# Patient Record
Sex: MALE | URBAN - METROPOLITAN AREA
[De-identification: names, ages, dates, MRNs, and addresses within clinical notes are randomized per-mention and may not be internally consistent; named-entity substitution may affect disease eponyms.]

---

## 2024-08-01 ENCOUNTER — TELEPHONE (OUTPATIENT)
Dept: TRANSPLANT | Facility: CLINIC | Age: 69
End: 2024-08-01

## 2024-08-12 ENCOUNTER — TELEPHONE (OUTPATIENT)
Dept: TRANSPLANT | Facility: CLINIC | Age: 69
End: 2024-08-12

## 2024-09-19 ENCOUNTER — HOSPITAL ENCOUNTER (OUTPATIENT)
Facility: OTHER | Age: 69
Discharge: HOME | End: 2024-09-19
Attending: INTERNAL MEDICINE | Admitting: INTERNAL MEDICINE
Payer: MEDICARE

## 2024-09-19 VITALS
SYSTOLIC BLOOD PRESSURE: 171 MMHG | TEMPERATURE: 98 F | HEIGHT: 66 IN | BODY MASS INDEX: 27.32 KG/M2 | OXYGEN SATURATION: 92 % | WEIGHT: 170 LBS | HEART RATE: 88 BPM | RESPIRATION RATE: 18 BRPM | DIASTOLIC BLOOD PRESSURE: 77 MMHG

## 2024-09-19 DIAGNOSIS — T82.868A THROMBOSIS OF KIDNEY DIALYSIS ARTERIOVENOUS GRAFT, INITIAL ENCOUNTER: Primary | ICD-10-CM

## 2024-09-19 DIAGNOSIS — N18.6 END STAGE RENAL DISEASE: ICD-10-CM

## 2024-09-19 LAB — POCT GLUCOSE: 73 MG/DL (ref 70–110)

## 2024-09-19 PROCEDURE — C1887 CATHETER, GUIDING: HCPCS | Mod: TXP | Performed by: INTERNAL MEDICINE

## 2024-09-19 PROCEDURE — C1725 CATH, TRANSLUMIN NON-LASER: HCPCS | Mod: TXP | Performed by: INTERNAL MEDICINE

## 2024-09-19 PROCEDURE — C1894 INTRO/SHEATH, NON-LASER: HCPCS | Mod: TXP | Performed by: INTERNAL MEDICINE

## 2024-09-19 PROCEDURE — C1757 CATH, THROMBECTOMY/EMBOLECT: HCPCS | Mod: TXP | Performed by: INTERNAL MEDICINE

## 2024-09-19 PROCEDURE — 63600175 PHARM REV CODE 636 W HCPCS: Mod: JZ,JG,TXP | Performed by: INTERNAL MEDICINE

## 2024-09-19 PROCEDURE — 99153 MOD SED SAME PHYS/QHP EA: CPT | Mod: TXP | Performed by: INTERNAL MEDICINE

## 2024-09-19 PROCEDURE — 99152 MOD SED SAME PHYS/QHP 5/>YRS: CPT | Mod: TXP | Performed by: INTERNAL MEDICINE

## 2024-09-19 PROCEDURE — 27201423 OPTIME MED/SURG SUP & DEVICES STERILE SUPPLY: Mod: TXP | Performed by: INTERNAL MEDICINE

## 2024-09-19 PROCEDURE — C1769 GUIDE WIRE: HCPCS | Mod: TXP | Performed by: INTERNAL MEDICINE

## 2024-09-19 RX ORDER — CARVEDILOL 25 MG/1
25 TABLET ORAL
COMMUNITY
Start: 2024-04-05

## 2024-09-19 RX ORDER — HYDROXYCHLOROQUINE SULFATE 200 MG/1
200 TABLET, FILM COATED ORAL
COMMUNITY
Start: 2023-10-05

## 2024-09-19 RX ORDER — SEVELAMER CARBONATE 800 MG/1
1600 TABLET, FILM COATED ORAL
COMMUNITY
Start: 2023-11-28

## 2024-09-19 RX ORDER — MIDAZOLAM HYDROCHLORIDE 1 MG/ML
INJECTION INTRAMUSCULAR; INTRAVENOUS
Status: DISCONTINUED | OUTPATIENT
Start: 2024-09-19 | End: 2024-09-19 | Stop reason: HOSPADM

## 2024-09-19 RX ORDER — HEPARIN SODIUM 1000 [USP'U]/ML
INJECTION, SOLUTION INTRAVENOUS; SUBCUTANEOUS
Status: DISCONTINUED | OUTPATIENT
Start: 2024-09-19 | End: 2024-09-19 | Stop reason: HOSPADM

## 2024-09-19 RX ORDER — CHOLECALCIFEROL (VITAMIN D3) 50 MCG
50 TABLET ORAL
COMMUNITY
Start: 2024-06-06

## 2024-09-19 RX ORDER — FENTANYL CITRATE 50 UG/ML
INJECTION, SOLUTION INTRAMUSCULAR; INTRAVENOUS
Status: DISCONTINUED | OUTPATIENT
Start: 2024-09-19 | End: 2024-09-19 | Stop reason: HOSPADM

## 2024-09-19 RX ORDER — SERTRALINE HYDROCHLORIDE 50 MG/1
1 TABLET, FILM COATED ORAL DAILY
COMMUNITY

## 2024-09-19 RX ORDER — ALBUTEROL SULFATE 90 UG/1
INHALANT RESPIRATORY (INHALATION)
COMMUNITY
Start: 2024-06-06

## 2024-09-19 RX ORDER — HYDRALAZINE HYDROCHLORIDE 50 MG/1
1 TABLET, FILM COATED ORAL 3 TIMES DAILY
COMMUNITY
Start: 2023-12-06

## 2024-09-19 RX ORDER — VIT B COMP NO.3/FOLIC/C/BIOTIN 1 MG-60 MG
1 TABLET ORAL
COMMUNITY
Start: 2024-05-06

## 2024-09-19 RX ORDER — PRAVASTATIN SODIUM 80 MG/1
40 TABLET ORAL
COMMUNITY
Start: 2024-06-06

## 2024-09-19 NOTE — Clinical Note
35 ml of contrast were injected throughout the case. 15 mL of contrast was the total wasted during the case. 50 mL was the total amount used during the case.

## 2024-09-19 NOTE — H&P
[facility]  History & Physical    Subjective:      Chief Complaint/Reason for Admission: Here for declot    Papi Goins is a 69 y.o. male with ESRD (MWF at [facility] with Dr. Saeed) who presents today for declot after noting no flow yesterday.  Of note, he says he clotted before, many months ago.  This access is a couple of years old.    Past Medical History:   Diagnosis Date    COPD (chronic obstructive pulmonary disease)     Diabetes mellitus     ESRD (end stage renal disease) on dialysis     Hypertension      History reviewed. No pertinent surgical history.  Social History     Tobacco Use    Smoking status: Former     Types: Cigarettes    Smokeless tobacco: Never       PTA Medications   Medication Sig    albuterol (PROVENTIL/VENTOLIN HFA) 90 mcg/actuation inhaler Take by mouth.    carvediloL (COREG) 25 MG tablet Take 25 mg by mouth.    cholecalciferol, vitamin D3, (VITAMIN D3) 50 mcg (2,000 unit) Tab Take 50 mcg by mouth.    hydrALAZINE (APRESOLINE) 50 MG tablet Take 1 tablet by mouth 3 (three) times daily.    hydroxychloroquine (PLAQUENIL) 200 mg tablet Take 200 mg by mouth.    pravastatin (PRAVACHOL) 80 MG tablet Take 40 mg by mouth.    LUIS-FRANNY RX 1- mg-mg-mcg Tab Take 1 tablet by mouth.    sertraline (ZOLOFT) 50 MG tablet Take 1 tablet by mouth once daily.    sevelamer carbonate (RENVELA) 800 mg Tab Take 1,600 mg by mouth.     Review of patient's allergies indicates:  No Known Allergies     Review of Systems   Constitutional: Negative.    Respiratory: Negative.     Cardiovascular: Negative.        Objective:      Vital Signs (Most Recent)  Temp: 98 °F (36.7 °C) (09/19/24 1136)  Pulse: 63 (09/19/24 1136)  Resp: 18 (09/19/24 1136)  BP: (!) 142/76 (09/19/24 1136)  SpO2: (!) 94 % (09/19/24 1136)    Vital Signs Range (Last 24H):  Temp:  [98 °F (36.7 °C)]   Pulse:  [63]   Resp:  [18]   BP: (142)/(76)   SpO2:  [94 %]     Physical Exam  Constitutional:       General: He is not in acute distress.      Appearance: He is well-developed. He is not diaphoretic.   HENT:      Head: Normocephalic and atraumatic.   Pulmonary:      Effort: Pulmonary effort is normal. No respiratory distress.   Musculoskeletal:      Cervical back: Neck supple.      Comments: RUE straight arm AVG with no flow or pulse augmentation.   Skin:     General: Skin is warm and dry.   Neurological:      Mental Status: He is alert and oriented to person, place, and time.   Psychiatric:         Behavior: Behavior normal.         Assessment:      There are no hospital problems to display for this patient.      Plan:      Declot  today.  Risks explained, consent signed.

## 2024-09-19 NOTE — BRIEF OP NOTE
[facility] - Cath Lab  Brief Operative Note    Surgery Date: 9/19/2024     Surgeons and Role:     * Leandro Aviles MD - Primary    Assisting Surgeon: Brian Whitaker MD    Pre-op Diagnosis:  End stage renal disease [N18.6]    Post-op Diagnosis:  Post-Op Diagnosis Codes:     * End stage renal disease [N18.6]    Procedure(s) (LRB):  THROMBECTOMY, HEMODIALYSIS GRAFT OR FISTULA / UPPER ARM AVG / AVF (Right)    Anesthesia: 1% lidocaine, 1 mg versed, 50 mcg fentanyl    Operative Findings: Patient was prepped and draped in a sterile fashion.  This was a successful declot of his RUE straight arm AV graft with angioplasty performed in the venous anastomosis portion of the graft.  Patient tolerated the procedure well and no immediate complications noted.      Estimated Blood Loss: 10 mL         Specimens:   Specimen (24h ago, onward)      None              Discharge Note    OUTCOME:  Patient was prepped and draped in a sterile fashion.  This was a successful declot of his RUE straight arm AV graft with angioplasty performed in the venous anastomosis portion of the graft.  Patient tolerated the procedure well and no immediate complications noted.    DISPOSITION: Home or Self Care    FINAL DIAGNOSIS:  <principal problem not specified>    FOLLOWUP: In clinic in 1 week for suture removal and follow up exam/ultrasound    DISCHARGE INSTRUCTIONS:    Discharge Procedure Orders   Lifting restrictions   Order Comments: No heavy lifting for 24 hours.     Call MD for:  temperature >100.4     Call MD for:  severe uncontrolled pain     Call MD for:  redness, tenderness, or signs of infection (pain, swelling, redness, odor or green/yellow discharge around incision site)     Call MD for:   Order Comments: Bleeding from the access site.     Activity as tolerated

## 2024-09-19 NOTE — PROCEDURES
[facility] Interventional Nephrology Procedure Report    Date of Procedure:  09/19/2024 2:46 PM     :  Leandro Aviles MD  Assistant: Brian Whitaker MD     Indication for Procedure:  Thrombosed RUE straight arm AVG      Referring Provider:  [facility] dialysis unit and Dr. Saeed     Procedures Performed:  1.  Access cannulation  2.  Second cannulation  3.  Venous administration of TPA  4.  Venous angioplasty  5.  Percutaneous thrombectomy  6.  Arteriogram  7.  Moderate conscious sedation     Medications Administered:  1.  2 mg of tPA IV  2.  Heparin 4000 units IV  3.  Versed 1 mg IV  4.  Fentanyl 50 mcg IV     Blood Loss:  Approximately 10 mL     Contrast Used:  Approximately 50 mL     Procedure in Detail:    After informed consent was obtained, the patient was prepped and draped in the usual sterile fashion.  His RUE straight arm AV graft was cannulated in the venous facing direction with a micropuncture set.  The microwire was confirmed to be in correct location under fluoroscopy and a 4-Nigerian microsheath was established.  A glidewire was advanced easily to the central circulation and the microsheath was removed.  A 6-Nigerian sheath was established.  Then, an over-the-wire Berenstein guiding catheter was advanced to the central circulation and a central venogram was performed, which revealed patent central vasculature (although there was a stenosis noted at the proximal portion of the SVC).  After the patient was evaluated with the physician, moderate sedation was then administered.  A pullback venogram was performed, which revealed stenosis beginning at the venous anastomosis portion of the graft.  So, the wire was reinserted and the Berenstein catheter was removed.  Then, 2 mg of TPA was injected into the venous facing sheath with pressure was held at the arterial anastomosis.  Following this, a 7 x 80 mm conquest PTA balloon was advanced to the venous anastomosis and inflated to full effacement with waist  seen on the balloon prior to full effacement.  Angioplasty was repeated back within the graft to macerate the thrombus all the while holding pressure at the arterial anastomosis.       The balloon was then removed and a second cannulation was made in the arterial facing direction with the micropuncture set.  Again, the microwire was confirmed to be in the correct location under fluoroscopy.  Microsheath was established and a glidewire was advanced across the arterial anastomosis and up into the brachial artery and then the micro sheath was removed and a 6-Nicaraguan sheath was established.  Then using an over-the-wire Lina catheter, we readvanced across the arterial anastomosis, inflated and a pullback maneuver was performed with significant thrombus aspirated through the arterial facing sheath.  This procedure was repeated approximately 4 times after which there was noted to be increased pulsatility of the AV graft.  So at this point, the Lina catheter was advanced across the arterial anastomosis and an arteriogram was performed.  At least 6 cm of brachial artery was seen passing down into the forearm and passed the bifurcation into the radial and ulnar arteries and no distal emboli were seen.   There was, however some residual clot in the rzxah-mhaajgrg-rwqwjlmkmam region of the graft, so we repeated Lina pullback was performed approximately 2 more  times.  We then took the Lina catheter on the venous facing wire and swept towards the central circulation to sweep out any residual thrombus and there was improved thrilling flow felt up the AV graft.      The arterial facing guidewire and sheath were then removed and hemostasis was achieved using a #2 Ethilon suture.  We then administered 4000 units of IV heparin and a repeat fistulogram was performed, which revealed brisk flow through the lower portion of the AV graft with residual stenosis seen at the venous anastomosis, so the same 7 x 80 mm conquest  balloon was advanced to the venous anastomosis and again was inflated to full effacement with mild waist (minimal pressure required to fully inflate).  This was held fully effaced for 1 minute, after which a repeat angiogram was performed which showed no change in the venous anastomosis, but this was not felt to be a hemodynamically significant lesion.  The venous facing sheath was then removed and hemostasis was achieved using a #2 Ethilon suture.        IMPRESSION AND PLAN:  This is a successful percutaneous thrombectomy using mechanical and pharmacologic thrombolysis.  We will have the patient return in 1 week for suture removal and a followup ultrasound.        Leandro Aviles MD  [phone]

## 2024-09-19 NOTE — PLAN OF CARE
Papi Goins has met all discharge criteria from Phase II. Vital Signs are stable, ambulating  without difficulty. Discharge instructions given, patient verbalized understanding. Discharged from facility via wheelchair in stable condition.

## 2024-11-25 ENCOUNTER — TELEPHONE (OUTPATIENT)
Dept: TRANSPLANT | Facility: CLINIC | Age: 69
End: 2024-11-25
Payer: MEDICARE

## 2024-11-26 DIAGNOSIS — Z91.89 CARDIOVASCULAR EVENT RISK: ICD-10-CM

## 2024-11-26 DIAGNOSIS — Z76.82 ORGAN TRANSPLANT CANDIDATE: ICD-10-CM

## 2024-11-26 DIAGNOSIS — N18.6 END STAGE RENAL DISEASE: Primary | ICD-10-CM

## 2024-12-27 ENCOUNTER — TELEPHONE (OUTPATIENT)
Dept: TRANSPLANT | Facility: CLINIC | Age: 69
End: 2024-12-27
Payer: MEDICAID

## 2024-12-30 ENCOUNTER — TELEPHONE (OUTPATIENT)
Dept: TRANSPLANT | Facility: CLINIC | Age: 69
End: 2024-12-30
Payer: MEDICAID

## 2024-12-30 NOTE — TELEPHONE ENCOUNTER
MA notes per Adherence form     FOR THE PAST THREE MONTHS:    1-AMA's 10/21/24 no time or reason given   0-No-shows    No concerns with care giving, transportation, or mental health    Scanned in pt's media    Dorene Espinoza  Abdominal Transplant MA

## 2025-01-02 ENCOUNTER — TELEPHONE (OUTPATIENT)
Dept: TRANSPLANT | Facility: CLINIC | Age: 70
End: 2025-01-02
Payer: MEDICARE

## 2025-02-04 ENCOUNTER — HOSPITAL ENCOUNTER (OUTPATIENT)
Dept: RADIOLOGY | Facility: HOSPITAL | Age: 70
Discharge: HOME | End: 2025-02-04
Attending: NURSE PRACTITIONER
Payer: MEDICARE

## 2025-02-04 ENCOUNTER — OFFICE VISIT (OUTPATIENT)
Dept: TRANSPLANT | Facility: CLINIC | Age: 70
End: 2025-02-04
Payer: MEDICARE

## 2025-02-04 ENCOUNTER — TELEPHONE (OUTPATIENT)
Dept: TRANSPLANT | Facility: CLINIC | Age: 70
End: 2025-02-04
Payer: MEDICARE

## 2025-02-04 VITALS
TEMPERATURE: 97 F | DIASTOLIC BLOOD PRESSURE: 48 MMHG | BODY MASS INDEX: 25.85 KG/M2 | HEART RATE: 72 BPM | OXYGEN SATURATION: 94 % | SYSTOLIC BLOOD PRESSURE: 97 MMHG | RESPIRATION RATE: 18 BRPM | HEIGHT: 67 IN | WEIGHT: 164.69 LBS

## 2025-02-04 DIAGNOSIS — Z99.2 ESRD ON HEMODIALYSIS: ICD-10-CM

## 2025-02-04 DIAGNOSIS — L93.0 DISCOID LUPUS ERYTHEMATOSUS: ICD-10-CM

## 2025-02-04 DIAGNOSIS — N18.6 HYPERPARATHYROIDISM DUE TO ESRD ON DIALYSIS: ICD-10-CM

## 2025-02-04 DIAGNOSIS — Z99.2 TYPE 2 DIABETES MELLITUS WITH CHRONIC KIDNEY DISEASE ON CHRONIC DIALYSIS, WITHOUT LONG-TERM CURRENT USE OF INSULIN: ICD-10-CM

## 2025-02-04 DIAGNOSIS — N18.6 ANEMIA IN ESRD (END-STAGE RENAL DISEASE): ICD-10-CM

## 2025-02-04 DIAGNOSIS — Z76.82 ORGAN TRANSPLANT CANDIDATE: ICD-10-CM

## 2025-02-04 DIAGNOSIS — N18.6 TYPE 2 DIABETES MELLITUS WITH CHRONIC KIDNEY DISEASE ON CHRONIC DIALYSIS, WITHOUT LONG-TERM CURRENT USE OF INSULIN: ICD-10-CM

## 2025-02-04 DIAGNOSIS — N25.81 HYPERPARATHYROIDISM DUE TO ESRD ON DIALYSIS: ICD-10-CM

## 2025-02-04 DIAGNOSIS — E78.5 HYPERLIPIDEMIA, UNSPECIFIED HYPERLIPIDEMIA TYPE: ICD-10-CM

## 2025-02-04 DIAGNOSIS — E11.22 TYPE 2 DIABETES MELLITUS WITH CHRONIC KIDNEY DISEASE ON CHRONIC DIALYSIS, WITHOUT LONG-TERM CURRENT USE OF INSULIN: ICD-10-CM

## 2025-02-04 DIAGNOSIS — D63.1 ANEMIA IN ESRD (END-STAGE RENAL DISEASE): ICD-10-CM

## 2025-02-04 DIAGNOSIS — I12.0 BENIGN HYPERTENSION WITH ESRD (END-STAGE RENAL DISEASE): ICD-10-CM

## 2025-02-04 DIAGNOSIS — N18.6 END STAGE RENAL DISEASE: ICD-10-CM

## 2025-02-04 DIAGNOSIS — N18.6 ESRD ON HEMODIALYSIS: ICD-10-CM

## 2025-02-04 DIAGNOSIS — Z99.2 HYPERPARATHYROIDISM DUE TO ESRD ON DIALYSIS: ICD-10-CM

## 2025-02-04 DIAGNOSIS — F33.1 MAJOR DEPRESSIVE DISORDER, RECURRENT, MODERATE: ICD-10-CM

## 2025-02-04 DIAGNOSIS — R76.12 POSITIVE QUANTIFERON-TB GOLD TEST: ICD-10-CM

## 2025-02-04 DIAGNOSIS — N18.6 BENIGN HYPERTENSION WITH ESRD (END-STAGE RENAL DISEASE): ICD-10-CM

## 2025-02-04 DIAGNOSIS — J44.9 CHRONIC OBSTRUCTIVE PULMONARY DISEASE, UNSPECIFIED COPD TYPE: ICD-10-CM

## 2025-02-04 DIAGNOSIS — Z01.818 PRE-TRANSPLANT EVALUATION FOR CHRONIC KIDNEY DISEASE: Primary | ICD-10-CM

## 2025-02-04 PROBLEM — I10 ESSENTIAL (PRIMARY) HYPERTENSION: Status: ACTIVE | Noted: 2025-02-04

## 2025-02-04 PROBLEM — E11.8 TYPE 2 DIABETES MELLITUS WITH UNSPECIFIED COMPLICATIONS: Status: ACTIVE | Noted: 2025-02-04

## 2025-02-04 PROCEDURE — 71046 X-RAY EXAM CHEST 2 VIEWS: CPT | Mod: 26,TXP,, | Performed by: RADIOLOGY

## 2025-02-04 PROCEDURE — 99204 OFFICE O/P NEW MOD 45 MIN: CPT | Mod: S$PBB,TXP,, | Performed by: TRANSPLANT SURGERY

## 2025-02-04 PROCEDURE — 93978 VASCULAR STUDY: CPT | Mod: 26,TXP,, | Performed by: STUDENT IN AN ORGANIZED HEALTH CARE EDUCATION/TRAINING PROGRAM

## 2025-02-04 PROCEDURE — 71046 X-RAY EXAM CHEST 2 VIEWS: CPT | Mod: TC,TXP

## 2025-02-04 PROCEDURE — 72192 CT PELVIS W/O DYE: CPT | Mod: TC,TXP

## 2025-02-04 PROCEDURE — 99204 OFFICE O/P NEW MOD 45 MIN: CPT | Mod: S$PBB,TXP,, | Performed by: PHYSICIAN ASSISTANT

## 2025-02-04 PROCEDURE — 99499 UNLISTED E&M SERVICE: CPT | Mod: S$PBB,TXP,, | Performed by: NURSE PRACTITIONER

## 2025-02-04 PROCEDURE — 76770 US EXAM ABDO BACK WALL COMP: CPT | Mod: TC,TXP

## 2025-02-04 PROCEDURE — 99999 PR PBB SHADOW E&M-EST. PATIENT-LVL V: CPT | Mod: PBBFAC,TXP,, | Performed by: NURSE PRACTITIONER

## 2025-02-04 PROCEDURE — 76770 US EXAM ABDO BACK WALL COMP: CPT | Mod: 26,59,TXP, | Performed by: STUDENT IN AN ORGANIZED HEALTH CARE EDUCATION/TRAINING PROGRAM

## 2025-02-04 PROCEDURE — 72192 CT PELVIS W/O DYE: CPT | Mod: 26,TXP,, | Performed by: RADIOLOGY

## 2025-02-04 PROCEDURE — 93978 VASCULAR STUDY: CPT | Mod: TC,TXP

## 2025-02-04 RX ORDER — HYDRALAZINE HYDROCHLORIDE 25 MG/1
25 TABLET, FILM COATED ORAL EVERY 8 HOURS
COMMUNITY
Start: 2024-11-15

## 2025-02-04 RX ORDER — ASPIRIN 81 MG/1
1 TABLET ORAL DAILY
COMMUNITY
Start: 2024-10-18

## 2025-02-04 NOTE — PROGRESS NOTES
INITIAL PATIENT EDUCATION NOTE AA    Mr. Papi Goins was seen in pre-kidney transplant clinic for evaluation for kidney, kidney/pancreas or pancreas only transplant.  The patient attended an individual video education session that discussed/reviewed the following aspects of transplantation: evaluation including diagnostic and laboratory testing,(Chemistries, Hematology, Serologies including HIV and Hepatitis and HLA) required for transplantation and selection committee process, UNOS waitlist management/multiple listings, types of organs offered (KDPI < 85%, KDPI > 85%, PHS risk, DCD, HCV+, HIV+ for HIV+ recipients and enbloc/dual), financial aspects, surgical procedures, dietary instruction pre- and post-transplant, health maintenance pre- and post-transplant, post-transplant hospitalization and outpatient follow-up, potential to participate in a research protocol, and medication management and side effects.  A question and answer session was provided after the presentation.    The patient was seen by all members of the multi-disciplinary team to include: Nephrologist/BECKY, Surgeon, , Transplant Coordinator, , Pharmacist and Dietician (if applicable).    The patient reviewed and signed all consents for evaluation which were witnessed and sent to scanning [facility] the [facility].    The patient was given an education book and plan for further evaluation based on his individual assessment.      The Patient was educated on OPTN policy change regarding race based eGFR. For Black or  Americans, this eGFR could have shown that their kidneys were working better than they were.    Because of this change, we are looking at everyones record and assessing waiting time for people who are eligible. We will be reviewing your medical records and will notify you if you are eligible. We also encouraged patient to provide span 20 labs that are not in our electronic medical records    Reviewed  program requirement for complete COVID vaccination with documentation prior to listing.  COVID education information reviewed with patient. Patient encouraged to be up to date on all vaccinations.     The patient was informed that the transplant team would manage immediate post op pain. If the patient requires long term pain management, they will need to have that pain management addressed by their PCP or previous provider who wrote for long term pain medicines.    The patient was encouraged to call with any questions or concerns.

## 2025-02-04 NOTE — PROGRESS NOTES
PRE-TRANSPLANT INFECTIOUS DISEASE CONSULT    Reason for Visit:  Pre-transplant evaluation  Referring Provider: Aaareferral Self     History of Present Illness:    69 y.o. male with a history of ESRD on HD presents for pre-kidney transplant evaluation.    Infectious History:  Recent hospital admissions: No  Recent infections: No  Recent or current antibiotic use: No  History of recurrent infections *(sinus / pneumonia / UTI / SBP)*: No  History of diabetic foot wound or bone/joint infection: No  Recent dental infections, issues or procedures: No  History of chicken pox: Yes  History of shingles: No  History of STI: No  History of COVID infection: Yes    History of Immunosuppression:  Prior chemotherapy / immunosuppression: No  Prior transplant: No  History of splenectomy: No    Tuberculosis:  Prior screening for latent TB: Yes - Reportedly negative  Prior diagnosis of latent TB: No  Risk factors for TB *known exposure, incarceration, homelessness*: No    Geographical exposures:  Currently lives in [place] with senior apt alone  Lived in the following states: [place], [place]  Lived or travelled to the [facility] [place]: No  International travel: Yes - [place]  Travel-associated illness: No    Social/Environmental:  Occupation:  Retired from [employer] - [employer]  Pets: No   Livestock: No  [place]  Hobbies: none  Water: [place]  Consumption of raw/undercooked meat or seafood?  No  Tobacco: No  Alcohol: No  Recreational drug use:  No  Sexual partners: none      Past Histories:   Past Medical History:   Diagnosis Date    Acute hypoxic respiratory failure     Anemia     COPD (chronic obstructive pulmonary disease)     COPD (chronic obstructive pulmonary disease)     Depression     Diabetes mellitus     Discoid lupus     Discoid lupus     ESRD (end stage renal disease) on dialysis     Hyperlipidemia     Hypertension     Hypervolemia     Hypoglycemia, unspecified     Multiple gastric ulcers      Past  Surgical History:   Procedure Laterality Date    AV FISTULA PLACEMENT      HERNIA REPAIR      THROMBECTOMY OF HEMODIALYSIS ACCESS SITE Right 09/19/2024    Procedure: THROMBECTOMY, HEMODIALYSIS GRAFT OR FISTULA / UPPER ARM AVG / AVF;  Surgeon: Leandro Aviles MD;  Location: [facility] CATH LAB;  Service: Nephrology;  Laterality: Right;     Family History   Problem Relation Name Age of Onset    Hypertension Mother      Heart disease Mother      Heart disease Father      Hypertension Father      Heart disease Brother      Hypertension Brother      Diabetes Brother       Social History     Tobacco Use    Smoking status: Former     Types: Cigarettes    Smokeless tobacco: Never   Substance Use Topics    Alcohol use: Not Currently    Drug use: Never     Review of patient's allergies indicates:  No Known Allergies      Current antibiotics:  Antibiotics (From admission, onward)      None              Review of Systems  Review of Systems   Constitutional: Negative for chills, fever, malaise/fatigue and night sweats.   Cardiovascular:  Negative for chest pain and leg swelling.   Respiratory:  Negative for cough, hemoptysis, shortness of breath, sputum production and wheezing.    Skin:  Negative for rash and suspicious lesions.   Gastrointestinal:  Positive for diarrhea. Negative for abdominal pain, constipation, heartburn, nausea and vomiting.   Genitourinary:  Negative for dysuria and hematuria.          Objective  Physical Exam  Constitutional:       General: He is not in acute distress.     Appearance: Normal appearance. He is well-developed. He is not ill-appearing, toxic-appearing or diaphoretic.       HENT:      Head: Normocephalic and atraumatic.      Mouth/Throat:      Lips: Pink. No lesions.      Mouth: Mucous membranes are moist. No injury or oral lesions.      Dentition: Abnormal dentition. Has dentures (upper). Dental caries present. No gingival swelling or dental abscesses.      Tongue: No lesions.      Palate: No  "lesions.      Pharynx: Oropharynx is clear. No pharyngeal swelling.   Cardiovascular:      Rate and Rhythm: Normal rate and regular rhythm.      Heart sounds: Normal heart sounds. No murmur heard.     No friction rub. No gallop.   Pulmonary:      Effort: Pulmonary effort is normal. No respiratory distress.      Breath sounds: Normal breath sounds. No wheezing or rales.   Abdominal:      General: Bowel sounds are normal. There is no distension.      Palpations: Abdomen is soft. There is no mass.      Tenderness: There is no abdominal tenderness. There is no guarding or rebound.   Skin:     General: Skin is warm and dry.   Neurological:      Mental Status: He is alert and oriented to person, place, and time.   Psychiatric:         Behavior: Behavior normal.       Labs:    CBC:   Lab Results   Component Value Date    WBC 7.28 02/04/2025    HGB 13.8 (L) 02/04/2025    HCT 44.1 02/04/2025    MCV 98 02/04/2025     (L) 02/04/2025    GRAN 3.7 02/04/2025    GRAN 51.2 02/04/2025    LYMPH 1.7 02/04/2025    LYMPH 23.5 02/04/2025    MONO 1.3 (H) 02/04/2025    MONO 17.4 (H) 02/04/2025    EOSINOPHIL 6.5 02/04/2025       Syphilis screening:   Lab Results   Component Value Date    TREPABIGMIGG Nonreactive 02/04/2025        TB screening: No results found for: "TBGOLDPLUS", "TSPOTSCREN"    HIV screening:   Lab Results   Component Value Date    WIK73AGTT Non-reactive 02/04/2025       Strongyloides IgG: No results found for: "STRONGANTIGG"    Hepatitis Serologies:   Lab Results   Component Value Date    HEPAIGG Non-reactive 02/04/2025    HEPBCAB Non-reactive 02/04/2025    HEPBSAB 141.58 02/04/2025    HEPBSAB Reactive 02/04/2025    HEPCAB Non-reactive 02/04/2025        Varicella IgG: No results found for: "VARICELLAINT"        There is no immunization history on file for this patient.     Immunization History:  Received all childhood vaccines: Yes  All household members receive annual flu vaccine: Yes  All household members are up " to date on COVID vaccine: Yes    Assessment and Plan    1. Risks of Infection: Available serologies were reviewed. No unusual risks of infection or significant barriers to transplantation were identified from the infectious disease standpoint given the information available at this time pending eval and treatment of positive quant gold..    - Acute infectious issues: None   - Pending serologies: None   - Please call if any pending serologic testing is positive.    2. Immunizations:  Based on the patient's immunization history and serologies, the following immunizations are recommended:  - Hepatitis A    Patient does not have immunity to hepatitis A    Vaccination ordered today: Yes   - Hepatitis B    Patient does have immunity to hepatitis B    Vaccination ordered today: No. Reason for not ordering: Immunity   - COVID    Current [facility] vaccination recommendations were discussed with the patient   - Annual high dose influenza     Vaccination ordered today: No. Reason for not ordering: Vaccination up to date - Patient reports having in 2024 but not documented   - Prevnar 20    Vaccination ordered today: No. Reason for not ordering: Vaccination up to date   - Tdap    Vaccination ordered today: No. Reason for not ordering: Vaccination up to date   - Shingrix    Vaccination ordered today: No. Reason for not ordering: Vaccination up to date    Recommended Pre-Transplant Immunization Schedule   Vaccine  0m 1m 2m 6m   Pneumococcal conjugate vaccine (Prevnar 20) X      Tetanus-diphtheria-pertussis (Tdap)* X      Hepatitis A Vaccine (Havrix)** X   X   Hepatitis B Vaccine (Heplisav)** X X     Influenza (annual) X      Zoster Recombinant Vaccine (Shingrix) X  X           *Administer booster every 10 years.       **Administer if no immunity demonstrated on serologies               Patient will receive vaccines at local pharmacy. A written prescription was provided for all vaccine doses.     3. Counseling:   I discussed with the  patient the risk for increased susceptibility to infections following transplantation including increased risk for infection right after transplant and if rejection should occur.  The patient has been counseled on the importance of vaccinations to decrease risk of infection and severe illness. Specific guidance has been provided to the patient regarding the patient's occupation, hobbies and activities to avoid future infectious complications.     4. Transplant Candidacy: Based on available information, there are no identified significant barriers to transplantation from an infectious disease standpoint pending eval and treatment of positive quant gold.  Patient will be scheduled for fu with transplant ID for further eval and treatment,  Final determination of transplant candidacy will be made once evaluation is complete and reviewed by the Selection Committee.      Follow up with infectious disease as needed.       The total time for evaluation and management services performed on 02/04/2025 was greater than 45 minutes.

## 2025-02-04 NOTE — PROGRESS NOTES
Transplant Recipient Adult Psychosocial Assessment    Papi Goins  [address]  [facility] [address]  No relevant phone numbers on file.   Home  [phone] (home)  Work  There is no work phone number on file.  E-mail  No e-mail address on record    Sex: male  YOB: 1955  Age: 69 y.o.    Encounter Date: 2025  [place] Citizen: yes  Primary Language: English   Needed: no    Emergency Contact:  Name: Krystal Bernard  Relationship: sister  Address: [address] [place], [place] [address] [address]  Phone Numbers: [phone] (mobile)    Family/Social Support:   Number of dependents/: 0  Marital history:   Other family dynamics: Parents , 2 adult children Trinity Raphael, [phone], lives in [place], [place] and Sekou Slaughter, son lives in [place] and works as a  (phone number unknown), 2 sisters and 3 brothers    Household Composition: Patient [facility] in [facility].    Do you and your caregivers have access to reliable transportation? yes  PRIMARY CAREGIVER: Krystal Bernard will be primary caregiver, phone number [phone].      provided in-depth information to patient and caregiver regarding pre- and post-transplant caregiver role.   strongly encourages patient and caregiver to have concrete plan regarding post-transplant care giving, including back-up caregiver(s) to ensure care giving needs are met as needed.    Patient states understanding all aspects of caregiver role/commitment and is able/willing/committed to being caregiver to the fullest extent necessary.    Patient verbalizes understanding of the education provided today and caregiver responsibilities.         remains available. Patient agree to contact  in a timely manner if concerns arise.      Patient informed going forward his caregiver will need to accompany him to all transplant visit. Patient verbalized understanding and  agreeable.     Able to take time off work without financial concerns: yes.     Additional Significant Others who will Assist with Transplant:  Name: Apolonia Slaughter  Age: 62  City: [place] State: [place]  Relationship: friend  Does person drive? yes    Living Will: yes  Healthcare Power of : no  Advance Directives on file:    provided patient with copy of LW/HCPA documents and provided education on completion of forms.    Living Donors: No. Education and resource information given to patient.    Highest Education Level: High School (9-12) or GED  Reading Ability: 12th grade  Reports difficulty with: writing due to Parkinson's disease (hands shake)  Learns Best By:  Auditory instructions      Status: yes: [employer], date: 9225-1130  [facility] Benefits: yes     Working for Income: No  If no, reason not working: Patient Choice - Retired      Monthly Income:  [place]: $1299  VA Benefits: $80  Food [place]: $28    Able to afford all costs now and if transplanted, including medications: yes  Patient verbalizes understanding of personal responsibilities related to transplant costs and the importance of having a financial plan to ensure that patients transplant costs are fully covered.      provided fundraising information/education.  Patient verbalizes understanding.   remains available.    Insurance:   Payer/Plan Subscr  Sex Relation Sub. Ins. ID Effective Group Num   1. [employer]* ADAM LOPEZ 1955 Male Self [id number]                                   [address] [address]   2. [place]* ADAM LOPEZ 1955 Male Self [id number]* 25                                    [address] [address]     Primary Insurance (for UNOS reporting): Public Insurance - Medicare & Choice  Secondary Insurance (for UNOS reporting): Public Insurance - Medicaid  Patient verbalizes clear understanding that patient may experience difficulty obtaining and/or be denied insurance  coverage post-surgery. This includes and is not limited to disability insurance, life insurance, health insurance, burial insurance, long term care insurance, and other insurances.    Patient also reports understanding that future health concerns related to or unrelated to transplantation may not be covered by patient's insurance.  Resources and information provided and reviewed.      Patient provides verbal permission to release any necessary information to outside resources for patient care and discharge planning.  Resources and information provided are reviewed.      Dialysis Adherence:  Patient reports adherence with dialysis treatment.  Dialysis center reports over last three months that the patient has had 0- AMAs and 0- no-shows. Compliance form shows 0-AMA and 1-no show on October 21,2024. Patient reported if he miss [facility] it's [facility]     Infusion Service: patient utilizing? no  Home Health: patient utilizing? no  DME: no  Pulmonary/Cardiac Rehab: Patient denies   ADLS:  Patient has a provider on Tuesday and Thursday from 8:30 AM to 2:30 PM to assist with some ADL's in the home. Patient reported he's able to take his own shower, cook and manage his medication without assistance. Patient is able to drive if needed.     Adherence:  Adherence education and counseling provided.     Per History Section:  Past Medical History:   Diagnosis Date    Acute hypoxic respiratory failure     COPD (chronic obstructive pulmonary disease)     COPD (chronic obstructive pulmonary disease)     Diabetes mellitus     Discoid lupus     ESRD (end stage renal disease) on dialysis     Hypertension     Hypervolemia     Hypoglycemia, unspecified     Multiple gastric ulcers      Social History     Tobacco Use    Smoking status: Former     Types: Cigarettes    Smokeless tobacco: Never   Substance Use Topics    Alcohol use: Not Currently     Social History     Substance and Sexual Activity    Drug Use Never     Social History     Substance and Sexual Activity   Sexual Activity Not Currently       Per Today's Psychosocial:  Tobacco:  Patient reported he quit smoking 6 years ago. Prior to quitting he smoked cigerrettes since he was 14 yrs old .  Alcohol: none, patient denies any use.  Illicit Drugs/Non-prescribed Medications: none, patient denies any use.    Patient states clear understanding of the potential impact of substance use as it relates to transplant candidacy and is aware of possible random substance screening.  Substance abstinence/cessation counseling, education and resources provided and reviewed.     Arrests/DWI/Treatment/Rehab: patient denies    Psychiatric History:    Mental Health: Hx of  depression and anxiety   Psychiatrist/Counselor: Patient reported [facility] Patient couldn't remember psychiatrist name. Patient stated his last visit was about a year ago. Patient reported his visit were virtual.   Medications:  Sertraline 50 MG  Suicide/Homicide Issues: Patient denies ever having suicidal or homicidal ideations.    Safety at home: Patient reported he feels safe at home.     Knowledge: Patient states having clear understanding and realistic expectations regarding the potential risks and potential benefits of organ transplantation and organ donation, agrees to discuss with health care team members and support system members, and to utilize available resources and express questions and/or concerns in order to further facilitate the pt informed decision-making.  Resources and information provided and reviewed.     Patient and Caregiver is aware of [facility]'s affiliation and/or partnership with agencies in home health care, LTAC, SNF, [facility], and other hospitals and clinics.    Understanding: Patient reports having a clear understanding of the many lifetime commitments involved with being a transplant recipient, including costs, compliance, medications, lab work,  procedures, appointments, concrete and financial planning, preparedness, timely and appropriate communication of concerns, abstinence (ETOH, tobacco, illicit non-prescribed drugs), adherence to all health care team recommendations, support system and caregiver involvement, appropriate and timely resource utilization and follow-through, mental health counseling as needed/recommended, and patient and caregiver responsibilities.  Social Service Handbook, resources and detailed educational information provided and reviewed.  Educational information provided.    Patient also reports current and expected compliance with health care regime and states having a clear understanding of the importance of compliance.      Patient reports a clear understanding that risks and benefits may be involved with organ transplantation and with organ donation.      Patient also reports clear understanding that psychosocial risk factors may affect patient, and include but are not limited to feelings of depression, generalized anxiety, anxiety regarding dependence on others, post traumatic stress disorder, feelings of guilt and other emotional and/or mental concerns, and/or exacerbation of existing mental health concerns.  Detailed resources provided and discussed.     Patient agrees to access appropriate resources in a timely manner as needed and/or as recommended, and to communicate concerns appropriately.  Patient also reports a clear understanding of treatment options available.      reviewed education, provided additional information, and answered questions.    Feelings or Concerns: Patient denies having any concerns and/or overwhelming feelings regarding transplant currently.    Coping: Identify Patient & Caregiver Strategies to [place]:   1. In the past - Prayer   2. Currently & Pre-transplant - Prayer   3. At the time of surgery - Prayer   4. During post-Transplant & Recovery Period - Prayer    Goals: Discontinue dialysis  and take better care of health.  Patient does not plan to return to work after transplant.     Interview Behavior: Patient presents as alert and oriented x 4, pleasant, good eye contact, well groomed, recall good, concentration/judgement good, average intelligence, calm, communicative, cooperative, and asking and answering questions appropriately.          Transplant Social Work - Candidacy  Assessment/Plan:     Psychosocial Suitability: Patient presents as a suitable candidate for kidney transplant at this time. Based on psychosocial risk factors, patient presents as medium risk, due to no confirmation of caregiver plan and limited family support.   Patient  does have adequate good adherence, appropriate coping skills, medical insurance, reliable transportation, being able to financially can afford medications and not using any substances unless prescribed.    Recommendations/Additional Comments: Patient is highly motivated to receive kidney transplant. SW recommends patient contact insurance for lodging benefits (if needed), conduct fundraising to assist patient with pay for cost of medications, food, gas, and other transplant related needs. SW recommends that patient remain aware of potential mental health concerns and contact the team if any concerns arise. Patient denied needing or wanting mental health resources or referrals at this time. Patient agreed to contact  team as needed. SW recommends that patient remain abstinent from tobacco, ETOH, and drug use. SW supports patient continued adherence. SW remains available to answer any questions or concerns that arise as the patient moves through the transplant process.    Final determination of transplant candidacy will be reviewed and determined by the selection committee.      Ana Vallejo LCSW

## 2025-02-04 NOTE — PROGRESS NOTES
PHARM.D. PRE-TRANSPLANT NOTE:    This patient's medication therapy was evaluated as part of his pre-transplant evaluation.      The following general pharmacologic concerns were noted: Aspirin can increase periop bleeding risk; resume patient's zoloft    The following concerns for post-operative pain management were noted: N/A    The following pharmacologic concerns related to HCV therapy were noted: N/A      This patient's medication profile was reviewed for considerations for DAA Hepatitis C therapy:    [x]  No current inducers of CYP 3A4 or PGP  [x]  No amiodarone on this patient's EMR profile in the last 24 months  [x]  No past or current atrial fibrillation on this patient's EMR profile       Current Outpatient Medications   Medication Sig Dispense Refill    acetaminophen (TYLENOL) 500 MG tablet Take 2 tablets (1,000 mg total) by mouth every 8 (eight) hours as needed for Pain (in dialysis access). 30 tablet 0    albuterol (PROVENTIL/VENTOLIN HFA) 90 mcg/actuation inhaler Inhale 1 puff into the lungs every 6 (six) hours as needed.      aspirin (ECOTRIN) 81 MG EC tablet Take 1 tablet by mouth once daily.      carvediloL (COREG) 25 MG tablet Take 25 mg by mouth 2 (two) times daily.      cholecalciferol, vitamin D3, (VITAMIN D3) 50 mcg (2,000 unit) Tab Take 50 mcg by mouth once daily.      hydrALAZINE (APRESOLINE) 25 MG tablet Take 25 mg by mouth every 8 (eight) hours.      hydroxychloroquine (PLAQUENIL) 200 mg tablet Take 200 mg by mouth once daily.      pravastatin (PRAVACHOL) 80 MG tablet Take 80 mg by mouth once daily.      LUIS-FRANNY RX 1- mg-mg-mcg Tab Take 1 tablet by mouth once daily.      sertraline (ZOLOFT) 50 MG tablet Take 1 tablet by mouth once daily.      sevelamer carbonate (RENVELA) 800 mg Tab Take 2,400 mg by mouth 3 (three) times daily with meals.       No current facility-administered medications for this visit.           I am available for consultation and can be contacted, as needed by the  other members of the Transplant team.

## 2025-02-04 NOTE — LETTER
February 4, 2025        Gianna Saeed  [address]  [facility] [address]  Phone: [phone]  Fax: [phone]             [facility] Transplant 1st Fl  [address]  [facility] [address]  Phone: [phone]   Patient: Papi Goins   MR Number: [id number]   YOB: 1955   Date of Visit: 2/4/2025       Dear Dr. Gianna Saeed    Thank you for referring Papi Goins to me for evaluation. Attached you will find relevant portions of my assessment and plan of care.    If you have questions, please do not hesitate to call me. I look forward to following Papi Goins along with you.    Sincerely,    Stefania Dennison, NP    Enclosure    If you would like to receive this communication electronically, please contact [email or web address] or [phone] to request [employer] Link access.    [employer] Link is a tool which provides read-only access to select patient information with whom you have a relationship. Its easy to use and provides real time access to review your patients record including encounter summaries, notes, results, and demographic information.    If you feel you have received this communication in error or would no longer like to receive these types of communications, please e-mail [email or web address]

## 2025-02-04 NOTE — TELEPHONE ENCOUNTER
Reviewed pt transplant labs. Pt is being followed by a dietitian at their dialysis unit and the dialysis unit dietitian was notified of the following abnormal labs via fax.    Cholesterol 82  Phos 5.7     RD available if needed.

## 2025-02-04 NOTE — PROGRESS NOTES
Transplant Surgery  Kidney Transplant Recipient Evaluation    Referring Physician: Gianna Saeed  Current Nephrologist: Gianna Saeed    Subjective:     Reason for Visit: evaluate transplant candidacy    History of Present Illness: Papi Goins is a 69 y.o. year old male undergoing transplant evaluation.    Dialysis History: Papi is on hemodialysis.      Transplant History: N/A    Etiology of Renal Disease: Diabetes Mellitus - Type I (based on medical records from referral).    External provider notes reviewed: Yes    Review of Systems  Objective:     Physical Exam:  Constitutional:   Vitals reviewed: yes   Well-nourished and well-groomed: yes  Eyes:   Sclerae icteric: no   Extraocular movements intact: yes  GI:    Bowel sounds normal: yes   Tenderness: no    If yes, quadrant/location: not applicable   Palpable masses: no    If yes, quadrant/location: not applicable   Hepatosplenomegaly: no   Ascites: no   Hernia: no    If yes, type/location: not applicable   Surgical scars: no    If yes, type/location: not applicable  Resp:   Effort normal: yes   Breath sounds normal: yes    CV:   Regular rate and rhythm: yes   Heart sounds normal: yes   Femoral pulses normal: yes   Extremities edematous: no  Skin:   Rashes or lesions present: no    If yes, describe:not applicable   Jaundice:: no    Musculoskeletal:   Gait normal: yes   Strength normal: yes  Psych:   Oriented to person, place, and time: yes   Affect and mood normal: yes    Additional comments: not applicable    Diagnostics:  The following labs have been reviewed: CBC  CMP  INR  The following radiology images have been independently reviewed and interpreted: pending    Counseling: We provided Papi Goins with a group education session today.  We discussed kidney transplantation at length with him, including risks, potential complications, and alternatives in the management of his renal failure.  The discussion included complications related to  anesthesia, bleeding, infection, primary nonfunction, and ATN.  I discussed the typical postoperative course, length of hospitalization, the need for long-term immunosuppression, and the need for long-term routine follow-up.  I discussed living-donor and -donor transplantation and the relative advantages and disadvantages of each.  I also discussed average waiting times for both living donation and  donation.  I discussed national and center-specific survival rates.  I also mentioned the potential benefit of multicenter listing to candidates listed with centers within more than one organ procurement organization.  All questions were answered.    Patient advised that it is recommended that all transplant candidates, and their close contacts and household members receive Covid vaccination.    Final determination of transplant candidacy will be made once evaluation is complete and reviewed by the Kidney & Kidney/Pancreas Selection Committee.    Coronavirus disease (COVID-19) caused by severe acute respiratory virus coronavirus 2 (SARS-C0V 2) is associated with increased mortality in solid organ transplant recipients (SOT) compared to non-transplant patients. Vaccine responses to vaccination are depressed against SARS-CoV2 compared to normal individuals but improve with third vaccination doses. Vaccination prior to SOT provides both the best opportunity for transplant candidates to develop protective immunity and to reduce the risk of serious COVID19 infections post transplantation. Organ transplant candidates at [facility] will be required to receive SARS-CoV-2 vaccination prior to being listed with a an active status, whenever possible. Exceptions will be made for disability related reasons or for sincerely held [employer] beliefs.          Transplant Surgery - Candidacy   Assessment/Plan:   Papi Goins has end stage renal disease (ESRD) on dialysis. I see no  "surgical contraindication to placing a kidney transplant. Based on available information, Papi Goins is a suitable kidney transplant candidate.     Needs good caregivers. Lives in a "senior apartment". [facility] in [facility].     Additional testing to be completed according to the Written Order Guidelines for Adult Pre-kidney and Pancreas Transplant Evaluation (KI-02).  Interpretation of tests and discussion of patient management involves all members of the multidisciplinary transplant team.    Saulo Tello Jr, MD       "

## 2025-02-04 NOTE — PROGRESS NOTES
Transplant Nephrology  Kidney Transplant Recipient Evaluation    Referring Physician: Gianna Saeed  Current Nephrologist: Gianna Saeed    Subjective:   CC:  Initial evaluation of kidney transplant candidacy.    HPI:  Mr. Goins is a 69 y.o. year old Black or  male who has presented to be evaluated as a potential kidney transplant recipient.  He has ESRD secondary to diabetic nephropathy and HTN.  Patient is currently on hemodialysis started on 9/3/2019. Patient is dialyzing on MWF schedule.  Patient reports that he is tolerating dialysis well.. He has a LUE AV fistula (healing) and a left chest catheter  for dialysis access.     Previous Transplant: no  Donor: no ; discussed and strongly encouraged living donation     Fx assessment: stays active and reports walking daily for a few blocks, which takes him ~ 15-20 minutes. He denies BREWER and does not appear frail.     Cardiac HX:  HTN, CHF, HLD, Hypotension noted today   Currently on coreg and hydralazine  BP Readings from Last 3 Encounters:   02/04/25 (!) 97/48   09/26/24 114/71   09/19/24 (!) 171/77         discoid lupus erythematosis --scalp  -on Plaquenil and a topical cream  Denies Other txs:  -[facility]    Diabetes: Type II    Onset of Diabetes: 2005    Meds/insulin: none  Retinopathy: yes  Neuropathy: yes    Amputation:  no  Symptomatic Peripheral Vascular Disease: unknown   Denies foot wounds     HX FAREED and COPD  Home home oxygen , reports last use ~ 1 year ago   Does not follow with a Pulmonologist    Former smoker --quit  ~ 6 years ago     Past Medical and Surgical History: Mr. Goins  has a past medical history of Acute hypoxic respiratory failure, Anemia, COPD (chronic obstructive pulmonary disease), COPD (chronic obstructive pulmonary disease), Depression, Diabetes mellitus, Discoid lupus, Discoid lupus, ESRD (end stage renal disease) on dialysis, Hyperlipidemia, Hypertension, Hypervolemia, Hypoglycemia,  "unspecified, and Multiple gastric ulcers.  He has a past surgical history that includes Thrombectomy of hemodialysis access site (Right, 09/19/2024) and AV fistula placement.    Past Social and Family History: Mr. Goins reports that he has quit smoking. His smoking use included cigarettes. He has never used smokeless tobacco. He reports that he does not currently use alcohol. He reports that he does not use drugs. His family history is not on file.    Review of Systems   Constitutional:  Negative for activity change, appetite change, chills, fatigue, fever and unexpected weight change.   HENT:  Negative for congestion, facial swelling, postnasal drip, rhinorrhea, sinus pressure, sore throat and trouble swallowing.    Eyes:  Negative for pain, redness and visual disturbance.   Respiratory:  Negative for cough, chest tightness, shortness of breath and wheezing.    Cardiovascular: Negative.  Negative for chest pain, palpitations and leg swelling.   Gastrointestinal:  Negative for abdominal pain, diarrhea, nausea and vomiting.   Genitourinary:  Positive for decreased urine volume (makes very little urine). Negative for dysuria, flank pain and urgency.   Musculoskeletal:  Negative for gait problem, neck pain and neck stiffness.   Skin:  Negative for rash.   Allergic/Immunologic: Positive for immunocompromised state. Negative for environmental allergies and food allergies.   Neurological:  Negative for dizziness, weakness, light-headedness and headaches.   Psychiatric/Behavioral:  Negative for agitation and confusion. The patient is not nervous/anxious.        Objective:   Blood pressure (!) 97/48, pulse 72, temperature 97.3 °F (36.3 °C), temperature source Tympanic, resp. rate 18, height 5' 6.93" (1.7 m), weight 74.7 kg (164 lb 10.9 oz), SpO2 (!) 94%.body mass index is 25.85 kg/m².    Physical Exam  Constitutional:       Appearance: Normal appearance. He is well-developed.   HENT:      Head: Normocephalic.      Nose: " "Nose normal.   Eyes:      Conjunctiva/sclera: Conjunctivae normal.      Pupils: Pupils are equal, round, and reactive to light.   Cardiovascular:      Rate and Rhythm: Normal rate and regular rhythm.      Heart sounds: Normal heart sounds.   Pulmonary:      Effort: Pulmonary effort is normal.      Breath sounds: Normal breath sounds.   Chest:       Abdominal:      General: Bowel sounds are normal.      Palpations: Abdomen is soft.   Musculoskeletal:        Arms:       Cervical back: Normal range of motion and neck supple.   Skin:     General: Skin is warm and dry.   Neurological:      Mental Status: He is alert and oriented to person, place, and time.   Psychiatric:         Behavior: Behavior normal.         Labs:  Lab Results   Component Value Date    WBC 7.28 02/04/2025    HGB 13.8 (L) 02/04/2025    HCT 44.1 02/04/2025     02/04/2025    K 4.9 02/04/2025     02/04/2025    CO2 24 02/04/2025    BUN 35 (H) 02/04/2025    CREATININE 6.9 (H) 02/04/2025    EGFRNORACEVR 8.0 (A) 02/04/2025    CALCIUM 8.9 02/04/2025    PHOS 5.7 (H) 02/04/2025    ALBUMIN 3.6 02/04/2025    AST 14 02/04/2025    ALT 18 02/04/2025    .3 (H) 02/04/2025       No results found for: "PREALBUMIN", "BILIRUBINUA", "GGT", "AMYLASE", "LIPASE", "PROTEINUA", "NITRITE", "RBCUA", "WBCUA"    No results found for: "HLAABCTYPE"    Labs were reviewed with the patient.    Assessment:     1. Pre-transplant evaluation for chronic kidney disease    2. ESRD on hemodialysis    3. Type 2 diabetes mellitus with chronic kidney disease on chronic dialysis, without long-term current use of insulin    4. Benign hypertension with ESRD (end-stage renal disease)    5. Major depressive disorder, recurrent, moderate    6. Anemia in ESRD (end-stage renal disease)    7. Hyperlipidemia, unspecified hyperlipidemia type    8. Hyperparathyroidism due to ESRD on dialysis    9. Chronic obstructive pulmonary disease, unspecified COPD type    10. Discoid lupus " erythematosus        Plan:   HX HTN and hypotension today in clinic, HLD, CHF: UTD cardiac testing and clearance  -get BP logs from dialysis center   HX discoid Lupus-scalp; on Plaquenil. Dermatology clearance and recs for txp   HX FAREED/COPD; former smoker; pulmonology clearance and recs; clarification of home oxygen needs  -add PFTs and 6 minute walk test    Transplant Candidacy:   Based on available information, Mr. Goins is a high-risk kidney transplant candidate.   Meets center eligibility for accepting HCV+ donor offer - Yes.  Patient educated on HCV+ donors. Papi is willing to accept HCV+ donor offer - Yes   Patient is a candidate for KDPI > 85 kidney donor offer - Yes.  Final determination of transplant candidacy will be made once workup is complete and reviewed by the selection committee.    Patient advised that it is recommended that all transplant candidates, and their close contacts and household members receive Covid vaccination.    UNOS Patient Status  Functional Status: 60% - Requires occasional assistance but is able to care for needs    Stefania Dennison NP

## 2025-02-05 ENCOUNTER — TELEPHONE (OUTPATIENT)
Dept: INFECTIOUS DISEASES | Facility: CLINIC | Age: 70
End: 2025-02-05
Payer: MEDICARE

## 2025-02-05 NOTE — TELEPHONE ENCOUNTER
And informed him of the positive QuantiFERON results.  Explained to him that he need for evaluation from Infectious Disease and will be set up with the Wound see transplant ID.  He continues to deny any fever, chills, cough, hemoptysis, night sweats, weight loss.

## 2025-02-16 ENCOUNTER — RESULTS FOLLOW-UP (OUTPATIENT)
Dept: TRANSPLANT | Facility: HOSPITAL | Age: 70
End: 2025-02-16

## 2025-02-18 DIAGNOSIS — N18.6 END STAGE RENAL DISEASE: Primary | ICD-10-CM

## 2025-02-18 DIAGNOSIS — Z76.82 ORGAN TRANSPLANT CANDIDATE: ICD-10-CM

## 2025-02-18 PROBLEM — Z01.810 PREOPERATIVE CARDIOVASCULAR EXAMINATION: Status: ACTIVE | Noted: 2025-02-18

## 2025-02-19 ENCOUNTER — TELEPHONE (OUTPATIENT)
Dept: TRANSPLANT | Facility: CLINIC | Age: 70
End: 2025-02-19
Payer: MEDICARE

## 2025-02-19 DIAGNOSIS — Z76.82 ORGAN TRANSPLANT CANDIDATE: ICD-10-CM

## 2025-02-19 DIAGNOSIS — N18.6 END STAGE RENAL DISEASE: Primary | ICD-10-CM

## 2025-02-21 ENCOUNTER — TELEPHONE (OUTPATIENT)
Dept: TRANSPLANT | Facility: CLINIC | Age: 70
End: 2025-02-21
Payer: MEDICARE

## 2025-03-05 ENCOUNTER — DOCUMENTATION ONLY (OUTPATIENT)
Dept: TRANSPLANT | Facility: CLINIC | Age: 70
End: 2025-03-05
Payer: MEDICARE

## 2025-03-24 PROBLEM — I50.22 CHRONIC SYSTOLIC (CONGESTIVE) HEART FAILURE: Status: ACTIVE | Noted: 2025-03-24

## 2025-03-25 ENCOUNTER — HOSPITAL ENCOUNTER (OUTPATIENT)
Dept: CARDIOLOGY | Facility: HOSPITAL | Age: 70
Discharge: HOME | End: 2025-03-25
Attending: NURSE PRACTITIONER
Payer: MEDICAID

## 2025-03-25 ENCOUNTER — HOSPITAL ENCOUNTER (OUTPATIENT)
Dept: PULMONOLOGY | Facility: CLINIC | Age: 70
Discharge: HOME | End: 2025-03-25
Payer: MEDICARE

## 2025-03-25 ENCOUNTER — RESULTS FOLLOW-UP (OUTPATIENT)
Dept: TRANSPLANT | Facility: CLINIC | Age: 70
End: 2025-03-25

## 2025-03-25 ENCOUNTER — OFFICE VISIT (OUTPATIENT)
Dept: CARDIOLOGY | Facility: CLINIC | Age: 70
End: 2025-03-25
Payer: MEDICAID

## 2025-03-25 ENCOUNTER — HOSPITAL ENCOUNTER (OUTPATIENT)
Dept: PULMONOLOGY | Facility: CLINIC | Age: 70
Discharge: HOME | End: 2025-03-25
Payer: MEDICAID

## 2025-03-25 VITALS
WEIGHT: 164.25 LBS | HEIGHT: 67 IN | HEART RATE: 61 BPM | SYSTOLIC BLOOD PRESSURE: 98 MMHG | BODY MASS INDEX: 25.78 KG/M2 | DIASTOLIC BLOOD PRESSURE: 70 MMHG

## 2025-03-25 VITALS
HEART RATE: 75 BPM | SYSTOLIC BLOOD PRESSURE: 110 MMHG | BODY MASS INDEX: 26.36 KG/M2 | DIASTOLIC BLOOD PRESSURE: 70 MMHG | HEIGHT: 66 IN | WEIGHT: 164 LBS

## 2025-03-25 VITALS
SYSTOLIC BLOOD PRESSURE: 89 MMHG | DIASTOLIC BLOOD PRESSURE: 50 MMHG | WEIGHT: 164 LBS | HEIGHT: 66 IN | BODY MASS INDEX: 26.36 KG/M2 | HEART RATE: 71 BPM

## 2025-03-25 VITALS — HEIGHT: 66 IN | WEIGHT: 164 LBS | BODY MASS INDEX: 26.36 KG/M2

## 2025-03-25 DIAGNOSIS — E78.5 HYPERLIPIDEMIA, UNSPECIFIED HYPERLIPIDEMIA TYPE: ICD-10-CM

## 2025-03-25 DIAGNOSIS — N18.6 END STAGE RENAL DISEASE: ICD-10-CM

## 2025-03-25 DIAGNOSIS — Z99.2 HYPERPARATHYROIDISM DUE TO ESRD ON DIALYSIS: ICD-10-CM

## 2025-03-25 DIAGNOSIS — Z76.82 ORGAN TRANSPLANT CANDIDATE: ICD-10-CM

## 2025-03-25 DIAGNOSIS — N18.6 TYPE 2 DIABETES MELLITUS WITH CHRONIC KIDNEY DISEASE ON CHRONIC DIALYSIS, WITHOUT LONG-TERM CURRENT USE OF INSULIN: ICD-10-CM

## 2025-03-25 DIAGNOSIS — I12.0 BENIGN HYPERTENSION WITH ESRD (END-STAGE RENAL DISEASE): ICD-10-CM

## 2025-03-25 DIAGNOSIS — Z99.2 TYPE 2 DIABETES MELLITUS WITH CHRONIC KIDNEY DISEASE ON CHRONIC DIALYSIS, WITHOUT LONG-TERM CURRENT USE OF INSULIN: ICD-10-CM

## 2025-03-25 DIAGNOSIS — E11.22 TYPE 2 DIABETES MELLITUS WITH CHRONIC KIDNEY DISEASE ON CHRONIC DIALYSIS, WITHOUT LONG-TERM CURRENT USE OF INSULIN: ICD-10-CM

## 2025-03-25 DIAGNOSIS — N18.6 HYPERPARATHYROIDISM DUE TO ESRD ON DIALYSIS: ICD-10-CM

## 2025-03-25 DIAGNOSIS — Z01.810 PREOPERATIVE CARDIOVASCULAR EXAMINATION: ICD-10-CM

## 2025-03-25 DIAGNOSIS — I50.22 CHRONIC SYSTOLIC (CONGESTIVE) HEART FAILURE: Primary | ICD-10-CM

## 2025-03-25 DIAGNOSIS — L93.0 DISCOID LUPUS ERYTHEMATOSUS: ICD-10-CM

## 2025-03-25 DIAGNOSIS — N25.81 HYPERPARATHYROIDISM DUE TO ESRD ON DIALYSIS: ICD-10-CM

## 2025-03-25 DIAGNOSIS — N18.6 BENIGN HYPERTENSION WITH ESRD (END-STAGE RENAL DISEASE): ICD-10-CM

## 2025-03-25 LAB
ALLENS TEST: ABNORMAL
ASCENDING AORTA: 3.12 CM
AV AREA BY CONTINUOUS VTI: 1.8 CM2
AV INDEX (PROSTH): 0.62
AV LVOT MEAN GRADIENT: 1 MMHG
AV LVOT PEAK GRADIENT: 2 MMHG
AV MEAN GRADIENT: 3 MMHG
AV PEAK GRADIENT: 6 MMHG
AV VALVE AREA BY VELOCITY RATIO: 1.7 CM²
AV VALVE AREA: 1.8 CM2
AV VELOCITY RATIO: 0.58
BSA FOR ECHO PROCEDURE: 1.86 M2
CFR FLOW - ANTERIOR: 2.76
CFR FLOW - INFERIOR: 2.59
CFR FLOW - LATERAL: 2.68
CFR FLOW - MAX: 3.33
CFR FLOW - MIN: 2.23
CFR FLOW - SEPTAL: 3.04
CFR FLOW - WHOLE HEART: 2.77
CV ECHO LV RWT: 0.39 CM
CV PHARM DOSE: 0.4 MG
CV STRESS BASE HR: 64 BPM
DELSYS: ABNORMAL
DIASTOLIC BLOOD PRESSURE: 53 MMHG
DLCO ADJ PRE: 7.86 ML/(MIN*MMHG) (ref 17.08–30.94)
DLCO SINGLE BREATH LLN: 17.08
DLCO SINGLE BREATH PRE REF: 32 %
DLCO SINGLE BREATH REF: 24.01
DLCOC SBVA LLN: 2.5
DLCOC SBVA PRE REF: 49.4 %
DLCOC SBVA REF: 3.8
DLCOC SINGLE BREATH LLN: 17.08
DLCOC SINGLE BREATH PRE REF: 32.8 %
DLCOC SINGLE BREATH REF: 24.01
DLCOCSBVAULN: 5.1
DLCOCSINGLEBREATHULN: 30.94
DLCOCSINGLEBREATHZSCORE: -3.83
DLCOSINGLEBREATHULN: 30.94
DLCOSINGLEBREATHZSCORE: -3.88
DLCOVA LLN: 2.5
DLCOVA PRE REF: 48.2 %
DLCOVA PRE: 1.83 ML/(MIN*MMHG*L) (ref 2.5–5.1)
DLCOVA REF: 3.8
DLCOVAULN: 5.1
DLVAADJ PRE: 1.88 ML/(MIN*MMHG*L) (ref 2.5–5.1)
DOP CALC AO PEAK VEL: 1.2 M/S
DOP CALC AO VTI: 23.6 CM
DOP CALC LVOT AREA: 2.8 CM2
DOP CALC LVOT DIAMETER: 1.9 CM
DOP CALC LVOT PEAK VEL: 0.7 M/S
DOP CALC LVOT STROKE VOLUME: 41.7 CM3
DOP CALCLVOT PEAK VEL VTI: 14.7 CM
E WAVE DECELERATION TIME: 243 MS
E/A RATIO: 0.56
E/E' RATIO: 7 M/S
ECHO EF ESTIMATED: 57 %
ECHO LV POSTERIOR WALL: 0.8 CM (ref 0.6–1.1)
EJECTION FRACTION- HIGH: 59 %
END DIASTOLIC INDEX-HIGH: 155 ML/M2
END DIASTOLIC INDEX-LOW: 91 ML/M2
END SYSTOLIC INDEX-HIGH: 78 ML/M2
END SYSTOLIC INDEX-LOW: 40 ML/M2
ERV LLN: -16449.03
ERV PRE REF: 98.4 %
ERV REF: 0.97
ERVULN: ABNORMAL
FEF 25 75 LLN: 1.27
FEF 25 75 PRE REF: 77 %
FEF 25 75 REF: 2.99
FEV05 LLN: 1.15
FEV05 REF: 2.28
FEV1 FVC LLN: 64
FEV1 FVC PRE REF: 103.6 %
FEV1 FVC REF: 77
FEV1 LLN: 1.67
FEV1 PRE REF: 91.6 %
FEV1 REF: 2.42
FEV1FVCZSCORE: 0.38
FEV1ZSCORE: -0.46
FIO2: 0.21
FRACTIONAL SHORTENING: 29.3 % (ref 28–44)
FRCPLETH LLN: 2.47
FRCPLETH PREREF: 73.1 %
FRCPLETH REF: 3.45
FRCPLETHULN: 4.44
FVC LLN: 2.25
FVC PRE REF: 88.3 %
FVC REF: 3.12
FVCZSCORE: -0.69
HCO3 UR-SCNC: 26.3 MMOL/L (ref 24–28)
INTERVENTRICULAR SEPTUM: 0.8 CM (ref 0.6–1.1)
IVC PRE: 2.71 L (ref 2.25–4.01)
IVC SINGLE BREATH LLN: 2.25
IVC SINGLE BREATH PRE REF: 86.9 %
IVC SINGLE BREATH REF: 3.12
IVCSINGLEBREATHULN: 4.01
IVRT: 133 MS
LA MAJOR: 5.3 CM
LA MINOR: 4.8 CM
LA WIDTH: 3.4 CM
LEFT ATRIUM SIZE: 3.1 CM
LEFT ATRIUM VOLUME INDEX MOD: 29 ML/M2
LEFT ATRIUM VOLUME INDEX: 25 ML/M2
LEFT ATRIUM VOLUME MOD: 53 ML
LEFT ATRIUM VOLUME: 45 CM3
LEFT INTERNAL DIMENSION IN SYSTOLE: 2.9 CM (ref 2.1–4)
LEFT VENTRICLE DIASTOLIC VOLUME INDEX: 40.22 ML/M2
LEFT VENTRICLE DIASTOLIC VOLUME: 74 ML
LEFT VENTRICLE MASS INDEX: 52.9 G/M2
LEFT VENTRICLE SYSTOLIC VOLUME INDEX: 17.4 ML/M2
LEFT VENTRICLE SYSTOLIC VOLUME: 32 ML
LEFT VENTRICULAR INTERNAL DIMENSION IN DIASTOLE: 4.1 CM (ref 3.5–6)
LEFT VENTRICULAR MASS: 97.3 G
LLN IC: -9999997.33
LV LATERAL E/E' RATIO: 6.1
LV SEPTAL E/E' RATIO: 7.2
MODE: ABNORMAL
MV PEAK A VEL: 0.77 M/S
MV PEAK E VEL: 0.43 M/S
NUC REST DIASTOLIC VOLUME INDEX: 68
NUC REST EJECTION FRACTION: 54
NUC REST SYSTOLIC VOLUME INDEX: 31
NUC STRESS DIASTOLIC VOLUME INDEX: 75
NUC STRESS EJECTION FRACTION: 60 %
NUC STRESS SYSTOLIC VOLUME INDEX: 30
OHS CV CPX 1 MINUTE RECOVERY HEART RATE: 77 BPM
OHS CV CPX 85 PERCENT MAX PREDICTED HEART RATE MALE: 128
OHS CV CPX MAX PREDICTED HEART RATE: 151
OHS CV CPX PATIENT IS FEMALE: 0
OHS CV CPX PATIENT IS MALE: 1
OHS CV CPX PEAK DIASTOLIC BLOOD PRESSURE: 33 MMHG
OHS CV CPX PEAK HEAR RATE: 64 BPM
OHS CV CPX PEAK RATE PRESSURE PRODUCT: 4480
OHS CV CPX PEAK SYSTOLIC BLOOD PRESSURE: 70 MMHG
OHS CV CPX PERCENT MAX PREDICTED HEART RATE ACHIEVED: 42
OHS CV CPX RATE PRESSURE PRODUCT PRESENTING: 6016
OHS CV INITIAL DOSE: 22.8 MCG/KG/MIN
OHS CV MODERATELY REDUCED FLOW CAPACITY: 0 %
OHS CV NO ISCHEMIA MILDLY REDUCED FLOW CAPACTY: 8 %
OHS CV NO ISCHEMIA MINIMALLY REDUCED FLOW CAPACITY: 65 %
OHS CV NORMAL FLOW CAPACITY COMPARABLE TO HEALTHY YOUNG VOLUNTEERS: 27 %
OHS CV PEAK DOSE: 22.7 MCG/KG/MIN
OHS CV PET ID: 8437
OHS CV RV/LV RATIO: 1 CM
OHS CV SEVERELY REDUCED FLOW CAPACITY: 0 %
OHS CV TOTAL EXAM DLP: 326.16 MGY-CM
PCO2 BLDA: 38.4 MMHG (ref 35–45)
PEF LLN: 4.73
PEF PRE REF: 56.7 %
PEF REF: 7.09
PH SMN: 7.44 [PH] (ref 7.35–7.45)
PHYSICIAN COMMENT: ABNORMAL
PISA TR MAX VEL: 2.5 M/S
PO2 BLDA: 74 MMHG (ref 80–100)
POC BE: 2 MMOL/L
POC SATURATED O2: 95 % (ref 95–100)
POC TCO2: 27 MMOL/L (ref 23–27)
PRE DLCO: 7.68 ML/(MIN*MMHG) (ref 17.08–30.94)
PRE ERV: 0.95 L (ref -16449.03–16450.97)
PRE FEF 25 75: 2.3 L/S (ref 1.27–4.7)
PRE FET 100: 7.51 SEC
PRE FEV05 REF: 74.6 %
PRE FEV1 FVC: 80.16 % (ref 64.31–88.93)
PRE FEV1: 2.21 L (ref 1.67–3.11)
PRE FEV5: 1.7 L (ref 1.15–3.42)
PRE FRC PL: 2.52 L (ref 2.47–4.44)
PRE FVC: 2.76 L (ref 2.25–4.01)
PRE IC: 1.8 L (ref -9999997.33–#######.####)
PRE PEF: 4.02 L/S (ref 4.73–9.45)
PRE REF IC: 67.5 %
PRE RV: 1.57 L (ref 1.81–3.16)
PRE TLC: 4.33 L (ref 5.16–7.47)
RA MAJOR: 5.07 CM
RA PRESSURE ESTIMATED: 3 MMHG
RA WIDTH: 3.97 CM
RAW PRE REF: 130.7 %
RAW PRE: 4 CMH2O*S/L (ref 3.06–3.06)
RAW REF: 3.06
REF IC: 2.67
REST FLOW - ANTERIOR: 0.47 CC/MIN/G
REST FLOW - INFERIOR: 0.38 CC/MIN/G
REST FLOW - LATERAL: 0.47 CC/MIN/G
REST FLOW - MAX: 0.55 CC/MIN/G
REST FLOW - MIN: 0.32 CC/MIN/G
REST FLOW - SEPTAL: 0.44 CC/MIN/G
REST FLOW - WHOLE HEART: 0.44 CC/MIN/G
RETIRED EF AND QEF - SEE NOTES: 47 %
RIGHT VENTRICLE DIASTOLIC BASEL DIMENSION: 4.1 CM
RV LLN: 1.81
RV PRE REF: 63.2 %
RV REF: 2.48
RV TB RVSP: 6 MMHG
RV TISSUE DOPPLER FREE WALL SYSTOLIC VELOCITY 1 (APICAL 4 CHAMBER VIEW): 10.81 CM/S
RVTLC LLN: 32
RVTLC PRE REF: 88.7 %
RVTLC PRE: 36.25 % (ref 31.89–49.85)
RVTLC REF: 41
RVTLCULN: 50
RVULN: 3.16
SAMPLE: ABNORMAL
SGAW PRE REF: 106.9 %
SGAW PRE: 0.09 1/(CMH2O*S) (ref 0.08–0.08)
SGAW REF: 0.08
SINUS: 3.2 CM
SITE: ABNORMAL
STJ: 2.68 CM
STRESS FLOW - ANTERIOR: 1.29 CC/MIN/G
STRESS FLOW - INFERIOR: 0.97 CC/MIN/G
STRESS FLOW - LATERAL: 1.25 CC/MIN/G
STRESS FLOW - MAX: 1.58 CC/MIN/G
STRESS FLOW - MIN: 0.71 CC/MIN/G
STRESS FLOW - SEPTAL: 1.33 CC/MIN/G
STRESS FLOW - WHOLE HEART: 1.21 CC/MIN/G
SYSTOLIC BLOOD PRESSURE: 94 MMHG
TDI LATERAL: 0.07 M/S
TDI SEPTAL: 0.06 M/S
TDI: 0.07 M/S
TLC LLN: 5.16
TLC PRE REF: 68.5 %
TLC REF: 6.31
TLC ULN: 7.47
TLCZSCORE: -2.84
TRICUSPID ANNULAR PLANE SYSTOLIC EXCURSION: 1.85 CM
TV PEAK GRADIENT: 24 MMHG
TV REST PULMONARY ARTERY PRESSURE: 28 MMHG
ULN IC: ABNORMAL
VA PRE: 4.19 L (ref 6.16–6.16)
VA SINGLE BREATH LLN: 6.16
VA SINGLE BREATH PRE REF: 68 %
VA SINGLE BREATH REF: 6.16
VASINGLEBREATHULN: 6.16
VC LLN: 2.25
VC PRE REF: 88.3 %
VC PRE: 2.76 L (ref 2.25–4.01)
VC REF: 3.12
VC ULN: 4.01
Z-SCORE OF LEFT VENTRICULAR DIMENSION IN END DIASTOLE: -2.19
Z-SCORE OF LEFT VENTRICULAR DIMENSION IN END SYSTOLE: -0.66

## 2025-03-25 PROCEDURE — 78434 AQMBF PET REST & RX STRESS: CPT | Mod: TXP

## 2025-03-25 PROCEDURE — 93306 TTE W/DOPPLER COMPLETE: CPT | Mod: TXP

## 2025-03-25 PROCEDURE — 99999 PR PBB SHADOW E&M-EST. PATIENT-LVL IV: CPT | Mod: PBBFAC,TXP,, | Performed by: INTERNAL MEDICINE

## 2025-03-25 PROCEDURE — 94618 PULMONARY STRESS TESTING: CPT | Mod: S$GLB,TXP,, | Performed by: INTERNAL MEDICINE

## 2025-03-25 PROCEDURE — 78434 AQMBF PET REST & RX STRESS: CPT | Mod: 26,TXP,, | Performed by: INTERNAL MEDICINE

## 2025-03-25 PROCEDURE — 93018 CV STRESS TEST I&R ONLY: CPT | Mod: TXP,,, | Performed by: INTERNAL MEDICINE

## 2025-03-25 PROCEDURE — 1101F PT FALLS ASSESS-DOCD LE1/YR: CPT | Mod: CPTII,S$GLB,TXP, | Performed by: INTERNAL MEDICINE

## 2025-03-25 PROCEDURE — 99214 OFFICE O/P EST MOD 30 MIN: CPT | Mod: PBBFAC,25,TXP | Performed by: INTERNAL MEDICINE

## 2025-03-25 PROCEDURE — A9555 RB82 RUBIDIUM: HCPCS | Mod: TXP | Performed by: NURSE PRACTITIONER

## 2025-03-25 PROCEDURE — 1126F AMNT PAIN NOTED NONE PRSNT: CPT | Mod: CPTII,S$GLB,TXP, | Performed by: INTERNAL MEDICINE

## 2025-03-25 PROCEDURE — 78431 MYOCRD IMG PET RST&STRS CT: CPT | Mod: 26,TXP,, | Performed by: INTERNAL MEDICINE

## 2025-03-25 PROCEDURE — 1159F MED LIST DOCD IN RCRD: CPT | Mod: CPTII,S$GLB,TXP, | Performed by: INTERNAL MEDICINE

## 2025-03-25 PROCEDURE — 94729 DIFFUSING CAPACITY: CPT | Mod: S$GLB,TXP,, | Performed by: INTERNAL MEDICINE

## 2025-03-25 PROCEDURE — 3288F FALL RISK ASSESSMENT DOCD: CPT | Mod: CPTII,S$GLB,TXP, | Performed by: INTERNAL MEDICINE

## 2025-03-25 PROCEDURE — 3074F SYST BP LT 130 MM HG: CPT | Mod: CPTII,S$GLB,TXP, | Performed by: INTERNAL MEDICINE

## 2025-03-25 PROCEDURE — 3008F BODY MASS INDEX DOCD: CPT | Mod: CPTII,S$GLB,TXP, | Performed by: INTERNAL MEDICINE

## 2025-03-25 PROCEDURE — 3044F HG A1C LEVEL LT 7.0%: CPT | Mod: CPTII,S$GLB,TXP, | Performed by: INTERNAL MEDICINE

## 2025-03-25 PROCEDURE — 99204 OFFICE O/P NEW MOD 45 MIN: CPT | Mod: S$GLB,TXP,, | Performed by: INTERNAL MEDICINE

## 2025-03-25 PROCEDURE — 3078F DIAST BP <80 MM HG: CPT | Mod: CPTII,S$GLB,TXP, | Performed by: INTERNAL MEDICINE

## 2025-03-25 PROCEDURE — 94010 BREATHING CAPACITY TEST: CPT | Mod: S$GLB,TXP,, | Performed by: INTERNAL MEDICINE

## 2025-03-25 PROCEDURE — 94726 PLETHYSMOGRAPHY LUNG VOLUMES: CPT | Mod: S$GLB,TXP,, | Performed by: INTERNAL MEDICINE

## 2025-03-25 PROCEDURE — 82803 BLOOD GASES ANY COMBINATION: CPT | Mod: S$GLB,TXP,, | Performed by: INTERNAL MEDICINE

## 2025-03-25 PROCEDURE — 36600 WITHDRAWAL OF ARTERIAL BLOOD: CPT | Mod: S$GLB,TXP,, | Performed by: INTERNAL MEDICINE

## 2025-03-25 PROCEDURE — 93306 TTE W/DOPPLER COMPLETE: CPT | Mod: 26,TXP,, | Performed by: INTERNAL MEDICINE

## 2025-03-25 PROCEDURE — 93016 CV STRESS TEST SUPVJ ONLY: CPT | Mod: TXP,,, | Performed by: INTERNAL MEDICINE

## 2025-03-25 PROCEDURE — 63600175 PHARM REV CODE 636 W HCPCS: Mod: TXP | Performed by: NURSE PRACTITIONER

## 2025-03-25 RX ORDER — AMINOPHYLLINE 25 MG/ML
75 INJECTION, SOLUTION INTRAVENOUS ONCE
Status: COMPLETED | OUTPATIENT
Start: 2025-03-25 | End: 2025-03-25

## 2025-03-25 RX ORDER — REGADENOSON 0.08 MG/ML
0.4 INJECTION, SOLUTION INTRAVENOUS
Status: COMPLETED | OUTPATIENT
Start: 2025-03-25 | End: 2025-03-25

## 2025-03-25 RX ADMIN — RUBIDIUM CHLORIDE RB-82 22.8 MILLICURIE: 150 INJECTION, SOLUTION INTRAVENOUS at 10:03

## 2025-03-25 RX ADMIN — AMINOPHYLLINE 75 MG: 25 INJECTION, SOLUTION INTRAVENOUS at 10:03

## 2025-03-25 RX ADMIN — RUBIDIUM CHLORIDE RB-82 22.7 MILLICURIE: 150 INJECTION, SOLUTION INTRAVENOUS at 10:03

## 2025-03-25 RX ADMIN — REGADENOSON 0.4 MG: 0.08 INJECTION, SOLUTION INTRAVENOUS at 10:03

## 2025-03-25 NOTE — PROGRESS NOTES
Subjective:   Patient ID:  Papi Goins is a 69 y.o. male who presents for eval of Preop    HPI: Has extensive CVD hx. The patient has no chest pain, SOB, TIA, palpitations, syncope or pre-syncope.Patient does exercise walking far.        Review of Systems   Constitutional: Negative for chills, decreased appetite, diaphoresis, fever, malaise/fatigue, night sweats, weight gain and weight loss.   HENT:  Negative for congestion, hoarse voice, nosebleeds, sore throat and tinnitus.    Eyes:  Negative for blurred vision, double vision, vision loss in left eye, vision loss in right eye, visual disturbance and visual halos.   Cardiovascular:  Negative for chest pain, claudication, cyanosis, dyspnea on exertion, irregular heartbeat, leg swelling, near-syncope, orthopnea, palpitations, paroxysmal nocturnal dyspnea and syncope.   Respiratory:  Negative for cough, hemoptysis, shortness of breath, sleep disturbances due to breathing, snoring, sputum production and wheezing.    Endocrine: Negative for cold intolerance, heat intolerance, polydipsia, polyphagia and polyuria.   Hematologic/Lymphatic: Negative for adenopathy and bleeding problem. Does not bruise/bleed easily.   Skin:  Negative for color change, dry skin, flushing, itching, nail changes, poor wound healing, rash, skin cancer, suspicious lesions and unusual hair distribution.   Musculoskeletal:  Negative for arthritis, back pain, falls, gout, joint pain, joint swelling, muscle cramps, muscle weakness, myalgias and stiffness.   Gastrointestinal:  Negative for abdominal pain, anorexia, change in bowel habit, constipation, diarrhea, dysphagia, heartburn, hematemesis, hematochezia, melena and vomiting.   Genitourinary:  Negative for decreased libido, dysuria, hematuria, hesitancy and urgency.   Neurological:  Negative for excessive daytime sleepiness, dizziness, focal weakness, headaches, light-headedness, loss of balance, numbness, paresthesias, seizures, sensory change,  "tremors, vertigo and weakness.   Psychiatric/Behavioral:  Negative for altered mental status, depression, hallucinations, memory loss, substance abuse and suicidal ideas. The patient does not have insomnia and is not nervous/anxious.    Allergic/Immunologic: Negative for environmental allergies and hives.       Objective: BP 98/70 (BP Location: Right forearm, Patient Position: Sitting)   Pulse 61   Ht 5' 7" (1.702 m)   Wt 74.5 kg (164 lb 3.9 oz)   BMI 25.72 kg/m²      Physical Exam  Constitutional:       General: He is not in acute distress.     Appearance: He is well-developed. He is not diaphoretic.   HENT:      Head: Normocephalic.   Eyes:      Pupils: Pupils are equal, round, and reactive to light.   Neck:      Thyroid: No thyromegaly.   Cardiovascular:      Rate and Rhythm: Normal rate and regular rhythm.      Pulses: Intact distal pulses.           Carotid pulses are 3+ on the right side and 3+ on the left side.       Radial pulses are 3+ on the right side and 3+ on the left side.        Femoral pulses are 3+ on the right side and 3+ on the left side.       Popliteal pulses are 3+ on the right side and 3+ on the left side.        Dorsalis pedis pulses are 3+ on the right side and 3+ on the left side.        Posterior tibial pulses are 3+ on the right side and 3+ on the left side.      Heart sounds: Normal heart sounds. No murmur heard.     No friction rub. No gallop.   Pulmonary:      Effort: Pulmonary effort is normal. No respiratory distress.      Breath sounds: Normal breath sounds. No wheezing or rales.   Chest:      Chest wall: No tenderness.   Abdominal:      General: There is no distension.      Palpations: Abdomen is soft. There is no mass.      Tenderness: There is no abdominal tenderness.   Musculoskeletal:         General: Normal range of motion.      Cervical back: Normal range of motion.   Lymphadenopathy:      Cervical: No cervical adenopathy.   Skin:     General: Skin is warm.      Nails: " There is no clubbing.   Neurological:      Mental Status: He is alert and oriented to person, place, and time.   Psychiatric:         Speech: Speech normal.         Behavior: Behavior normal.         Thought Content: Thought content normal.         Judgment: Judgment normal.         Assessment:     1. Chronic systolic (congestive) heart failure    2. Benign hypertension with ESRD (end-stage renal disease)    3. Discoid lupus erythematosus    4. Hyperlipidemia, unspecified hyperlipidemia type    5. Hyperparathyroidism due to ESRD on dialysis    6. Preoperative cardiovascular examination    7. Type 2 diabetes mellitus with chronic kidney disease on chronic dialysis, without long-term current use of insulin    8. End stage renal disease    9. Organ transplant candidate      Reviewed Echo and PET  Plan:   Discussed diet , achieving and maintaining ideal body weight, and exercise.   We reviewed meds in detail.  Reassured-Discussed goals, options, plan.  He can reduce Carvedilol to half pill twice   Omega-3 > 800 mg/d combined EPA/DHA.  Cleared for kidney transplant    Papi was seen today for hyperlipidemia and kidney transplant pre-evaluation.    Diagnoses and all orders for this visit:    Chronic systolic (congestive) heart failure    Benign hypertension with ESRD (end-stage renal disease)    Discoid lupus erythematosus    Hyperlipidemia, unspecified hyperlipidemia type    Hyperparathyroidism due to ESRD on dialysis    Preoperative cardiovascular examination    Type 2 diabetes mellitus with chronic kidney disease on chronic dialysis, without long-term current use of insulin    End stage renal disease  -     Ambulatory referral/consult to Cardiology    Organ transplant candidate  -     Ambulatory referral/consult to Cardiology            Follow up as needed

## 2025-03-25 NOTE — PATIENT INSTRUCTIONS
We reviewed meds in detail.  Reassured-Discussed goals, options, plan.  He can reduce Carvedilol to half pill twice   Omega-3 > 800 mg/d combined EPA/DHA.  Cleared for kidney transplant

## 2025-04-29 ENCOUNTER — OFFICE VISIT (OUTPATIENT)
Dept: DERMATOLOGY | Facility: CLINIC | Age: 70
End: 2025-04-29
Payer: MEDICARE

## 2025-04-29 DIAGNOSIS — L30.1 DYSHIDROTIC ECZEMA: ICD-10-CM

## 2025-04-29 PROCEDURE — 99204 OFFICE O/P NEW MOD 45 MIN: CPT | Mod: S$GLB,,, | Performed by: STUDENT IN AN ORGANIZED HEALTH CARE EDUCATION/TRAINING PROGRAM

## 2025-04-29 PROCEDURE — 3288F FALL RISK ASSESSMENT DOCD: CPT | Mod: CPTII,S$GLB,, | Performed by: STUDENT IN AN ORGANIZED HEALTH CARE EDUCATION/TRAINING PROGRAM

## 2025-04-29 PROCEDURE — 1101F PT FALLS ASSESS-DOCD LE1/YR: CPT | Mod: CPTII,S$GLB,, | Performed by: STUDENT IN AN ORGANIZED HEALTH CARE EDUCATION/TRAINING PROGRAM

## 2025-04-29 PROCEDURE — 1160F RVW MEDS BY RX/DR IN RCRD: CPT | Mod: CPTII,S$GLB,, | Performed by: STUDENT IN AN ORGANIZED HEALTH CARE EDUCATION/TRAINING PROGRAM

## 2025-04-29 PROCEDURE — 1126F AMNT PAIN NOTED NONE PRSNT: CPT | Mod: CPTII,S$GLB,, | Performed by: STUDENT IN AN ORGANIZED HEALTH CARE EDUCATION/TRAINING PROGRAM

## 2025-04-29 PROCEDURE — 99999 PR PBB SHADOW E&M-EST. PATIENT-LVL III: CPT | Mod: PBBFAC,,, | Performed by: STUDENT IN AN ORGANIZED HEALTH CARE EDUCATION/TRAINING PROGRAM

## 2025-04-29 PROCEDURE — 3044F HG A1C LEVEL LT 7.0%: CPT | Mod: CPTII,S$GLB,, | Performed by: STUDENT IN AN ORGANIZED HEALTH CARE EDUCATION/TRAINING PROGRAM

## 2025-04-29 PROCEDURE — 1159F MED LIST DOCD IN RCRD: CPT | Mod: CPTII,S$GLB,, | Performed by: STUDENT IN AN ORGANIZED HEALTH CARE EDUCATION/TRAINING PROGRAM

## 2025-04-29 RX ORDER — CLOBETASOL PROPIONATE 0.5 MG/G
OINTMENT TOPICAL
Qty: 30 G | Refills: 2 | Status: SHIPPED | OUTPATIENT
Start: 2025-04-29

## 2025-04-29 NOTE — PATIENT INSTRUCTIONS
Hand eczema    Recommend to minimize hand washing as much as safely possible- soap and water can worsen condition. Recommend to switch to Vanicream gentle cleanser (OTC) or CLN hand soap (Online)  Recommend to wear gloves when washing dishes or cleaning  Recommend moisturizing cream (I.e. CeraVe, Neutrogena Norwegian hand cream, etc) throughout day  Recommend Vaseline or Aquaphor under white cotton gloves (can purchase on Amazon) at night  For flares- start clobetasol ointment BID to AA, stop when clear and restart as needed (use under vaseline or Aquaphor)

## 2025-04-29 NOTE — PROGRESS NOTES
Subjective:      Patient ID:  Papi Goins is a 69 y.o. male who presents for No chief complaint on file.    69 y.o. male presents today for a rash.    New patient    1. Rash   Location: hands   Duration: about 1 year   Symptoms: peel / itch   Current treatments: none   Prior treatments tried: none   Other pertinent history: Dialysis for 2 years- workup for kidney transplant         Review of Systems    Objective:   Physical Exam   Skin:   Areas Examined (abnormalities noted in diagram):   RUE Inspected  LUE Inspection Performed  Nails and Digits Inspection Performed           Diagram Legend     Erythematous scaling macule/papule c/w actinic keratosis       Vascular papule c/w angioma      Pigmented verrucoid papule/plaque c/w seborrheic keratosis      Yellow umbilicated papule c/w sebaceous hyperplasia      Irregularly shaped tan macule c/w lentigo     1-2 mm smooth white papules consistent with Milia      Movable subcutaneous cyst with punctum c/w epidermal inclusion cyst      Subcutaneous movable cyst c/w pilar cyst      Firm pink to brown papule c/w dermatofibroma      Pedunculated fleshy papule(s) c/w skin tag(s)      Evenly pigmented macule c/w junctional nevus     Mildly variegated pigmented, slightly irregular-bordered macule c/w mildly atypical nevus      Flesh colored to evenly pigmented papule c/w intradermal nevus       Pink pearly papule/plaque c/w basal cell carcinoma      Erythematous hyperkeratotic cursted plaque c/w SCC      Surgical scar with no sign of skin cancer recurrence      Open and closed comedones      Inflammatory papules and pustules      Verrucoid papule consistent consistent with wart     Erythematous eczematous patches and plaques     Dystrophic onycholytic nail with subungual debris c/w onychomycosis     Umbilicated papule    Erythematous-base heme-crusted tan verrucoid plaque consistent with inflamed seborrheic keratosis     Erythematous Silvery Scaling Plaque c/w Psoriasis      See annotation      Assessment / Plan:        Dyshidrotic eczema  Status: chronic condition flaring and/or not at treatment goal  Moderate severity with a few fissures- reviewed can be form of ICD  Recommend to switch to fragrance-free soap for ex Dove white bar soap, Vanicream gentle cleanser (OTC) or CLN hand soap (Online)  Recommend to wear gloves when washing dishes or cleaning  Recommend moisturizing cream (I.e. CeraVe, Neutrogena Norwegian hand cream, etc) throughout day  Recommend Vaseline or Aquaphor under white cotton gloves (can purchase on Amazon) at night  For flares- start clobetasol ointment BID to AA, stop when clear and restart as needed (use under vaseline or Aquaphor)    RTC 1 year, sooner prn

## 2025-05-06 ENCOUNTER — OFFICE VISIT (OUTPATIENT)
Dept: PULMONOLOGY | Facility: CLINIC | Age: 70
End: 2025-05-06
Payer: MEDICARE

## 2025-05-06 VITALS
HEART RATE: 75 BPM | HEIGHT: 67 IN | DIASTOLIC BLOOD PRESSURE: 72 MMHG | OXYGEN SATURATION: 93 % | SYSTOLIC BLOOD PRESSURE: 100 MMHG | BODY MASS INDEX: 26.12 KG/M2 | WEIGHT: 166.44 LBS

## 2025-05-06 DIAGNOSIS — Z01.811 PREOPERATIVE RESPIRATORY EXAMINATION: ICD-10-CM

## 2025-05-06 DIAGNOSIS — R06.02 SHORTNESS OF BREATH: ICD-10-CM

## 2025-05-06 DIAGNOSIS — N18.6 END STAGE RENAL DISEASE: Primary | ICD-10-CM

## 2025-05-06 DIAGNOSIS — J44.9 CHRONIC OBSTRUCTIVE PULMONARY DISEASE, UNSPECIFIED COPD TYPE: ICD-10-CM

## 2025-05-06 DIAGNOSIS — Z76.82 ORGAN TRANSPLANT CANDIDATE: ICD-10-CM

## 2025-05-06 PROCEDURE — 99204 OFFICE O/P NEW MOD 45 MIN: CPT | Mod: S$GLB,TXP,, | Performed by: INTERNAL MEDICINE

## 2025-05-06 PROCEDURE — 1101F PT FALLS ASSESS-DOCD LE1/YR: CPT | Mod: CPTII,S$GLB,TXP, | Performed by: INTERNAL MEDICINE

## 2025-05-06 PROCEDURE — 1159F MED LIST DOCD IN RCRD: CPT | Mod: CPTII,S$GLB,TXP, | Performed by: INTERNAL MEDICINE

## 2025-05-06 PROCEDURE — 3008F BODY MASS INDEX DOCD: CPT | Mod: CPTII,S$GLB,TXP, | Performed by: INTERNAL MEDICINE

## 2025-05-06 PROCEDURE — 3074F SYST BP LT 130 MM HG: CPT | Mod: CPTII,S$GLB,TXP, | Performed by: INTERNAL MEDICINE

## 2025-05-06 PROCEDURE — 3288F FALL RISK ASSESSMENT DOCD: CPT | Mod: CPTII,S$GLB,TXP, | Performed by: INTERNAL MEDICINE

## 2025-05-06 PROCEDURE — 99999 PR PBB SHADOW E&M-EST. PATIENT-LVL IV: CPT | Mod: PBBFAC,TXP,, | Performed by: INTERNAL MEDICINE

## 2025-05-06 PROCEDURE — 3078F DIAST BP <80 MM HG: CPT | Mod: CPTII,S$GLB,TXP, | Performed by: INTERNAL MEDICINE

## 2025-05-06 PROCEDURE — 1126F AMNT PAIN NOTED NONE PRSNT: CPT | Mod: CPTII,S$GLB,TXP, | Performed by: INTERNAL MEDICINE

## 2025-05-06 PROCEDURE — 3044F HG A1C LEVEL LT 7.0%: CPT | Mod: CPTII,S$GLB,TXP, | Performed by: INTERNAL MEDICINE

## 2025-05-06 RX ORDER — OXYCODONE AND ACETAMINOPHEN 5; 325 MG/1; MG/1
1 TABLET ORAL NIGHTLY PRN
COMMUNITY
Start: 2025-05-01

## 2025-05-06 RX ORDER — ATORVASTATIN CALCIUM 40 MG/1
40 TABLET, FILM COATED ORAL
COMMUNITY
Start: 2025-02-25

## 2025-05-06 NOTE — PROGRESS NOTES
"Subjective:      Patient ID: Papi Goins is a 69 y.o. male.    Chief Complaint: No chief complaint on file.    HPI  The patient was seen/evaluated in person at this visit on 5/06/2025.    Mr. Goins comes to [facility] as a New Patient.  He is referred by Stefania Dennison NP (Kidney Transplant) for evaluation of FAREED/COPD; former smoker; pulmonology clearance and recs; clarification of home oxygen needs.    He is being evaluated for possible kidney transplant.  He is on hemodialysis MWF -- previously hospitalized for acute hypoxemic respiratory failure at [facility] facilities 3x between December 220232 and January 2024.  His respiratory status has been relatively stable over the last 16 months.  He infrequently uses albuterol and only uses oxygen with dialysis.  I don't see any past sleep studies in [facility] to confirm presence of obstructive sleep apnea.  He reports being followed at the [facility].    He denies any respiratory complaints at this visit.  Specifically, he denies shortness of breath when climbing stairs or performing usual daily activities.  He also denies orthopnea, PND, or other nocturnal symptoms.  He does not report cough, sputum production, night sweats, weight loss, or other constitutional complaints.    He was cleared for transplant from the Cardiology perspective at 3/25/2025 visit with Dr. Hurd. Cardiac PET stress has been normal without ischemia.      Pulmonary History  Diagnosis of "COPD" => no airflow obstruction noted on spirometry  Using albuterol rarely (a couple times per year)  On supplemental oxygen with dialysis for the last couple years  Prior smoking => 0.5 to 1 pack/day for about 50 years (age 14 to 63); cessation 6 years ago (around 2019)  Repeated hospitalizations in [facility] system since late 2023 with acute hypoxemic respiratory failure => pneumonia vs volume overload  CXRs with persistent right-sided pulmonary infiltrates; no past chest CT scan  [facility]600 [facility]  Possible asbestos exposure in his early 20s (mid-1970s)  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  - CXR in February 2025 showed right-sided infiltrates.      Medical History:    - Chronic systolic (congestive) heart failure    - Benign hypertension with ESRD (end-stage renal disease)    - Discoid lupus erythematosus    - Hyperlipidemia, unspecified hyperlipidemia type    - Hyperparathyroidism due to ESRD on dialysis    - Preoperative cardiovascular examination    - Type 2 diabetes mellitus with chronic kidney disease on chronic dialysis, without long-term current use of insulin    - End stage renal disease        Review of Systems   Constitutional:  Negative for activity change and fatigue.   Respiratory:  Negative for cough, sputum production, shortness of breath, wheezing, orthopnea, asthma nighttime symptoms, dyspnea on extertion, use of rescue inhaler and Paroxysmal Nocturnal Dyspnea.    Cardiovascular:  Negative for chest pain and leg swelling.     Objective:     Physical Exam   Constitutional: He is oriented to person, place, and time. No distress.   Cardiovascular: Normal rate, regular rhythm and normal heart sounds. Exam reveals no gallop.   No murmur heard.  Pulmonary/Chest: Normal expansion, symmetric chest wall expansion, effort normal and breath sounds normal. No stridor. No respiratory distress. He has no decreased breath sounds. He has no wheezes. He has no rhonchi. He has no rales.   Musculoskeletal:         General: No edema.   Neurological: He is alert and oriented to person, place, and time. Gait normal.   Skin: No cyanosis. Nails show no clubbing.   Psychiatric: He has a normal mood and affect. Judgment normal.   Nursing note and vitals reviewed.      Personal Diagnostic Review    CXR on 2/04/2025 =>  Cardiac size is  normal vascular catheter seen with its tip in the superior vena cava. Patchy fibrosis and some pleural thickening is seen at the right lung base that appear chronic. Could be a little pleural fluid present or old scarring.     PFTs have shown normal spirometry with mild restriction and severe DLCO impairment.  6MWT showed mild exercise impairment without significant oxygen desaturation while breathing room air (SpO2 94% => 93% => 98%).  Arterial blood gas showed normal acid-base status with very mild hypoxemia due to increased A:a gradient.    PFTs 3/25/2025    FVC  (pre-BD) 2.76 liters   FVC%  88%   FEV1 (pre-BD) 2.21 liters   FEV1%  92%   FEV1/FVC  80   SVC 2.76 liters   SVC% 88%   TLC  4.33 liters   TLC%  69%   RV  1.57 liters   RV%  63%   DLCO   (uncorr) 7.68 mL/mmHg/min   DLCO   (ezequiel) 7.86 mL/mmHg/min   DLCO%  33%   VA 4.19 liters   IVC 2.71 liters   6MWT 3/25/2025   Distance 293 meters   SpO2 palomo 93 %   SpO2 delta - 5%   Oxygen Room Air      03/25/25 11:23   POC PH 7.444   POC PCO2 38.4   POC PO2 74 (L)   POC HCO3 26.3   POC SATURATED O2 95   Sample ARTERIAL   POC TCO2 27   POC BE 2   FiO2 0.21   Allisons Room Air       03/25/2025---------Distance: 293.22 meters (962 feet) Predicted distance (lower limit of normal) is 359.48 meters.         O2 Sat % Supplemental Oxygen Heart Rate Blood Pressure Nova Scale   Pre-exercise  (Resting) 94 % Room Air 67 bpm 86/51 mmHg 1   During Exercise 93 % Room Air 74 bpm 98/65 mmHg 3   Post-exercise  (Recovery) 98 % Room Air  69 bpm           Echocardiogram on 3/25/2025 =>    Left Ventricle: The left ventricle is normal in size. Ventricular mass is normal. Normal wall thickness. Normal wall motion. There is normal systolic function with a visually estimated ejection fraction of 60 - 65%. There is normal diastolic function. Normal left ventricular filling pressure.    Right Ventricle: The right ventricle is normal in size. Wall thickness is normal. Systolic function is normal.     Left Atrium: Normal left atrial size.    Right Atrium: Normal right atrial size.    Aortic Valve: The aortic valve is a trileaflet valve. There is mild aortic valve sclerosis. There is moderate annular calcification present.    Aorta: Aortic root is normal in size measuring 3.20 cm. Aortic root at ST junction is normal. Ascending aorta is normal measuring 3.12 cm.    Pulmonary Artery: The estimated pulmonary artery systolic pressure is 28 mmHg.    IVC/SVC: Normal venous pressure at 3 mmHg.    Pericardium: There is no pericardial effusion.    Echocardiography Findings    Left Ventricle The left ventricle is normal in size. Ventricular mass is normal. Normal wall thickness. Normal wall motion. There is normal systolic function with a visually estimated ejection fraction of 60 - 65%. There is normal diastolic function. Normal left ventricular filling pressure.   Right Ventricle The right ventricle is normal in size. Wall thickness is normal. Systolic function is normal.   Left Atrium Normal left atrial size.   Right Atrium Normal right atrial size.   Aortic Valve The aortic valve is a trileaflet valve. There is mild aortic valve sclerosis. There is moderate annular calcification present. There is normal leaflet mobility. Aortic valve peak velocity is 1.2 m/s. Mean gradient is 3 mmHg.   Mitral Valve The mitral valve is structurally normal. There is normal leaflet mobility. There is trace regurgitation.   Tricuspid Valve The tricuspid valve is structurally normal. There is normal leaflet mobility. There is trace regurgitation.   Pulmonic Valve The pulmonic valve is structurally normal. There is normal leaflet mobility.   IVC/SVC Normal venous pressure at 3 mmHg.   Ascending Aorta Aortic root is normal in size measuring 3.20 cm. Aortic root at ST junction is normal. Ascending aorta is normal measuring 3.12 cm.   Pericardium and Other Findings There is no pericardial effusion.   Pulmonary Artery The estimated pulmonary  "artery systolic pressure is 28 mmHg.             5/6/2025     2:14 PM 3/25/2025     1:39 PM 3/25/2025    12:30 PM 3/25/2025    11:16 AM 3/25/2025     9:40 AM 2/4/2025     7:37 AM 9/26/2024    10:53 AM   Pulmonary Function Tests   SpO2 93 %     94 % 95 %   Ordering Provider    BARBARA Dennison      Performing nurse/tech/RT    Xu ORTEGA      Diagnosis    --      Height 5' 7" (1.702 m) 5' 7" (1.702 m) 5' 6" (1.676 m) 5' 6" (1.676 m) 5' 6" (1.676 m) 5' 6.93" (1.7 m) 5' 6" (1.676 m)   Weight 75.5 kg (166 lb 7.2 oz) 74.5 kg (164 lb 3.9 oz) 74.4 kg (164 lb) 74.4 kg (164 lb) 74.4 kg (164 lb) 74.7 kg (164 lb 10.9 oz) 72.1 kg (158 lb 15.2 oz)   BMI (Calculated) 26.1 25.7 26.5 26.5 26.5 25.8 25.7   6MWT Status    completed without stopping      Patient Reported    Dyspnea      Was O2 used?    No      6MW Distance walked (feet)    962 feet      Distance walked (meters)    293.22 meters      Did patient stop?    No      Type of assistive device(s) used?    no assistive devices      Oxygen Saturation    94 %      Supplemental Oxygen    Room Air      Heart Rate    67 bpm      Blood Pressure    86/51      Nova Dyspnea Rating     very light      Oxygen Saturation    93 %      Supplemental Oxygen    Room Air      Heart Rate    74 bpm      Blood Pressure    98/65      Nova Dyspnea Rating     moderate      Recovery Time (seconds)    165 seconds      Oxygen Saturation    98 %      Supplemental Oxygen    Room Air      Heart Rate    69 bpm      Is procedure ready for interpretation?    Yes           Assessment:     1. End stage renal disease    2. Organ transplant candidate    3. Shortness of breath    4. Chronic obstructive pulmonary disease, unspecified COPD type    5. Preoperative respiratory examination         Encounter Medications[1]  Orders Placed This Encounter   Procedures    CT Chest Without Contrast     Standing Status:   Future     Expected Date:   5/6/2025     Expiration Date:   5/6/2026     May the Radiologist modify the order " per protocol to meet the clinical needs of the patient?:   Yes     Does the patient wear a continuous glucose monitor?:   No       Plan:     Mr. Goins reports COPD due to his long smoking history (50 years) until cessation about 6 years ago (2019).  PFTs were most notable for DLCO impairment out of proportion to lung mechanics.  Given the possibility of prior asbestos exposure and CXR appearance, we will check chest CT to examine the lung parenchyma further.  Depending upon these results, we should be able to stratify his pulmonary risk for anesthesia and kidney transplant.    Non-contrast chest CT    Problem List Items Addressed This Visit       COPD (chronic obstructive pulmonary disease)    Overview   - Long smoking history -- 0.5 to 1 pack/day for 50 years until cessation in 2019  - PFTs in March 2025 with no obstruction but severe DLCO impairment.  - No past chest CT scan to assess for parenchymal emphysema changes.         Current Assessment & Plan   No indication for regular bronchodilator use based upon PFTs or symptoms.  Severe DLCO impairment without significant pulmonary hypertension => could reflect parenchymal lung disease such as emphysema or interstitial lung disease.  Check non-contrast chest CT.         End stage renal disease - Primary    Preoperative respiratory examination    Overview   - Being evaluated for kidney transplant.  - Severe DLCO impairment on PFTs with persistently abnormal CXR.  - Decreased distance on 6MWT => decreased exercise capacity but no significant desaturation with activity.  - Cardiac work up shows normal biventricular function without ischemia.  No pulmonary hypertension noted.         Current Assessment & Plan   Chest CT to assess for significant parenchymal lung disease that would explain DLCO impairment.  No indication for continued supplemental oxygen use.    Although reduced DLCO is concerning, it is not clear that there is an absolute pulmonary contraindication to  kidney transplant and surgery/anesthesia.  Final recommendations after review of additional imaging.          Other Visit Diagnoses         Organ transplant candidate          Shortness of breath        Relevant Orders    CT Chest Without Contrast            Note copied to BARBARA Dennison (Kidney Transplant)    Total Time = 45 minutes    Don Simeon MD  Pulmonary Disease  [facility] 9th Fl       [1]   Outpatient Encounter Medications as of 5/6/2025   Medication Sig Dispense Refill    acetaminophen (TYLENOL) 500 MG tablet Take 2 tablets (1,000 mg total) by mouth every 8 (eight) hours as needed for Pain (in dialysis access). 30 tablet 0    albuterol (PROVENTIL/VENTOLIN HFA) 90 mcg/actuation inhaler Inhale 1 puff into the lungs every 6 (six) hours as needed.      aspirin (ECOTRIN) 81 MG EC tablet Take 1 tablet by mouth once daily.      atorvastatin (LIPITOR) 40 MG tablet Take 40 mg by mouth.      carvediloL (COREG) 25 MG tablet Take 25 mg by mouth 2 (two) times daily.      cholecalciferol, vitamin D3, (VITAMIN D3) 50 mcg (2,000 unit) Tab Take 50 mcg by mouth once daily.      clobetasol 0.05% (TEMOVATE) 0.05 % Oint Apply twice daily to affected area of itching/flaking skin as needed for rash, stop when clear and restart as needed 30 g 2    hydrALAZINE (APRESOLINE) 25 MG tablet Take 25 mg by mouth every 8 (eight) hours.      hydroxychloroquine (PLAQUENIL) 200 mg tablet Take 200 mg by mouth once daily.      oxyCODONE-acetaminophen (PERCOCET) 5-325 mg per tablet Take 1 tablet by mouth nightly as needed. pain      pravastatin (PRAVACHOL) 80 MG tablet Take 80 mg by mouth once daily.      LUIS-FRANNY RX 1- mg-mg-mcg Tab Take 1 tablet by mouth once daily.      sertraline (ZOLOFT) 50 MG tablet Take 1 tablet by mouth once daily.      sevelamer carbonate (RENVELA) 800 mg Tab Take 2,400 mg by mouth 3 (three) times daily with meals.       No facility-administered encounter medications on file as of 5/6/2025.

## 2025-05-08 PROBLEM — Z01.811 PREOPERATIVE RESPIRATORY EXAMINATION: Status: ACTIVE | Noted: 2025-05-08

## 2025-05-08 NOTE — ASSESSMENT & PLAN NOTE
Chest CT to assess for significant parenchymal lung disease that would explain DLCO impairment.  No indication for continued supplemental oxygen use.    Although reduced DLCO is concerning, it is not clear that there is an absolute pulmonary contraindication to kidney transplant and surgery/anesthesia.  Final recommendations after review of additional imaging.

## 2025-05-08 NOTE — ASSESSMENT & PLAN NOTE
No indication for regular bronchodilator use based upon PFTs or symptoms.  Severe DLCO impairment without significant pulmonary hypertension => could reflect parenchymal lung disease such as emphysema or interstitial lung disease.  Check non-contrast chest CT.

## 2025-05-20 ENCOUNTER — HOSPITAL ENCOUNTER (OUTPATIENT)
Dept: RADIOLOGY | Facility: HOSPITAL | Age: 70
Discharge: HOME | End: 2025-05-20
Attending: INTERNAL MEDICINE
Payer: MEDICARE

## 2025-05-20 DIAGNOSIS — R06.02 SHORTNESS OF BREATH: ICD-10-CM

## 2025-05-20 PROCEDURE — 71250 CT THORAX DX C-: CPT | Mod: TC,TXP

## 2025-06-19 ENCOUNTER — TELEPHONE (OUTPATIENT)
Dept: TRANSPLANT | Facility: CLINIC | Age: 70
End: 2025-06-19
Payer: MEDICARE

## 2025-06-24 ENCOUNTER — TELEPHONE (OUTPATIENT)
Dept: TRANSPLANT | Facility: CLINIC | Age: 70
End: 2025-06-24
Payer: MEDICARE

## 2025-06-24 NOTE — TELEPHONE ENCOUNTER
SW spoke with patient for update on caregiver plan. Patient reported sister will be his primary caregiver and Apolonia Slaughter, friend [phone], drives/own vehicle will be his back-up caregiver. Patient risk status changed to low.     Ana Vallejo LCSW, Kidney Transplant

## 2025-06-25 ENCOUNTER — TELEPHONE (OUTPATIENT)
Dept: TRANSPLANT | Facility: CLINIC | Age: 70
End: 2025-06-25
Payer: MEDICARE

## 2025-06-25 NOTE — TELEPHONE ENCOUNTER
MA notes per Adherence form     FOR THE PAST THREE MONTHS:    0-AMA's  0-No-shows    No concerns with care giving, transportation, or mental health    Per adherence form pt has not missed any treatments, pt uses [facility] for transportation and is always on time.     Scanned in pt's media    Dorene Espinoza  Abdominal Transplant MA

## 2025-06-27 ENCOUNTER — COMMITTEE REVIEW (OUTPATIENT)
Dept: TRANSPLANT | Facility: CLINIC | Age: 70
End: 2025-06-27
Payer: MEDICARE

## 2025-06-27 ENCOUNTER — EPISODE CHANGES (OUTPATIENT)
Dept: TRANSPLANT | Facility: HOSPITAL | Age: 70
End: 2025-06-27

## 2025-06-27 NOTE — COMMITTEE REVIEW
Native Organ Dx: Diabetes Mellitus - Type II      Not approved for LRD/CAD transplant due to hypoxia, vascular calcifications, and frailty with advanced age    Not eligible for re-referral     Patient informed of committee's decision. All questions were answered and patient voiced understanding.     Note written by Yamilka Velez RN    ===============================================  I was present at the meeting and attest to the decision of the committee.    Ana Boston, DO  Transplant Nephrology

## 2025-06-27 NOTE — LETTER
June 27, 2025    Papi Buster  [address]  [place] [address]    Dear Papi Goins:  MRN: [id number]    It is the duty of the [facility] Selection Committee to determine which patients are candidates for a transplant. For this reason, our committee has the difficult task of evaluating patients to determine which ones have the greatest chance of having a successful transplant. We are aware of the magnitude of this responsibility, and we approach it with reverence and humility.    It is with regret I inform you that you are not approved as a transplant candidate due to hypoxia (low oxygen level), vascular calcifications (hardening of the vessels), and frailty (weakness) with advanced age. Based on this review, we have determined that at this time, you are not a candidate for a transplant at [facility].      The selection committee carefully considers each patient's transplant candidacy and determines whether it is safe to proceed with transplantation on a case-by-case basis using established selection criteria.  At present, the risk of proceeding with an elective transplant surgery has become too high.                                                                               Although the selection committee believes you are not a suitable transplant candidate, you have the option to be evaluated at other transplant centers who may have different selection criteria.  You may request your [facility] records be sent to any center of your choice by contacting our Medical Records Department at [phone].                                                                               Attached is a letter from the [employer] Organ [employer] ([employer]).  It describes the services and information offered to patients by [employer] and the Organ Procurement and Transplant Network.    The [facility] Selection Committee sincerely wishes you the best and remains available to answer any  questions.  Please do not hesitate to contact our pre-transplant office if we can assist you in any other way.                                                                               Sincerely,      Neeru Ferrer MD  Medical Director, Kidney & Kidney/Pancreas Transplantation    Cc:   Gianna Saeed MD          [facility]    Encl: [employer] Letter               The Organ Procurement and Transplantation Network   Toll-free patient services line: [phone]  Your resource for organ transplant information      Staffed 8:30 am - 5:00 pm ET Monday - Friday   Leave a message 24/7 to receive a call back    The Organ Procurement and Transplantation Network (OPTN) is the national transplant system. It makes the policies that decide how donated organs are matched to patients waiting for a transplant. The OPTN:    Makes sure donated organs get matched to people on the transplant waiting list  Tells people about the donation and transplant processes  Makes sure that the public knows about the need for more organ and tissue donations    The OPTN has a free patient services line that you can call to:  Get more information about:   o Organ donation and organ transplants   o Donation and transplant policies  Get an information kit with:   o A list of transplant hospitals   o Waiting list information  Talk about any questions you may have about your transplant hospital or organ procurement organization. The staff will do their best to help you or point you to others who may help.  Find out how you can volunteer with the OPTN and help shape transplant policy    The patient services line number is: 9-132-219-5237    Patient services line staff CANNOT answer questions about your own medical care, including:  Waiting list status  Test results  Medical records  You will need to call your transplant hospital for this information.    The following websites have more information about transplantation and  donation:  OPTN: https://optn.transplant.hrsa.gov/  For potential living donors and transplant recipients:   o Living donation process: https://optn.transplant.hrsa.gov/living-donation/     o Financial assistance: https://www.livingdonorassistance.org/  Transplantation data: https://www.srtr.org/  Organ donation: https://www.organdonor.gov/    Volunteer with the OPTN: https://optn.transplant.hrsa.gov/get-involved

## (undated) DEVICE — CATH CONQUEST 40 7 X 80 X 75

## (undated) DEVICE — CATH EMBO FOGARTY 5.5FRX80CM

## (undated) DEVICE — CATH ANGIO BRAID BERENSTEIN 5F

## (undated) DEVICE — SET MICROPUNCTUREECHO STIFF

## (undated) DEVICE — TRAY ANGIO BAPTIST

## (undated) DEVICE — GUIDEWIRE STD .035X180CM ANG

## (undated) DEVICE — PRESTO INFLATION DEVICE

## (undated) DEVICE — SHEATH PINNACLE 6FR HIFLO